# Patient Record
Sex: MALE | Race: WHITE | NOT HISPANIC OR LATINO | Employment: FULL TIME | URBAN - METROPOLITAN AREA
[De-identification: names, ages, dates, MRNs, and addresses within clinical notes are randomized per-mention and may not be internally consistent; named-entity substitution may affect disease eponyms.]

---

## 2017-01-12 ENCOUNTER — OFFICE VISIT (OUTPATIENT)
Dept: OPHTHALMOLOGY | Facility: CLINIC | Age: 62
End: 2017-01-12

## 2017-01-12 DIAGNOSIS — H25.049 POSTERIOR SUBCAPSULAR POLAR SENILE CATARACT, UNSPECIFIED LATERALITY: ICD-10-CM

## 2017-01-12 DIAGNOSIS — H52.13 MYOPIA, BILATERAL: Primary | ICD-10-CM

## 2017-01-12 ASSESSMENT — REFRACTION_WEARINGRX
SPECS_TYPE: PAL
OD_CYLINDER: +2.25
OS_AXIS: 172
OS_SPHERE: -9.25
OS_CYLINDER: +0.75
OD_AXIS: 077
OS_ADD: +2.25
OD_SPHERE: -10.50
OS_ADD: +2.25
SPECS_TYPE: PAL
OD_ADD: +2.25
OS_AXIS: 148
OS_CYLINDER: +1.00
OD_SPHERE: -11.25
OS_SPHERE: -9.00
OD_CYLINDER: +1.50
OD_ADD: +2.25
OD_AXIS: 085

## 2017-01-12 ASSESSMENT — REFRACTION
OD_AXIS: 078
OS_AXIS: 159
OS_ADD: +2.50
OD_ADD: +2.50
OS_SPHERE: -9.50
OD_CYLINDER: +2.75
OS_CYLINDER: +0.75
OD_SPHERE: -11.25

## 2017-01-12 ASSESSMENT — REFRACTION_MANIFEST
OD_AXIS: 075
OD_SPHERE: -12.00
OS_SPHERE: -9.75
OS_AXIS: 150
OS_CYLINDER: +1.00
OD_CYLINDER: +3.00

## 2017-01-12 ASSESSMENT — VISUAL ACUITY
CORRECTION_TYPE: GLASSES
OS_CC: 20/25-
METHOD: SNELLEN - LINEAR
OD_CC: 20/30+2

## 2017-01-12 ASSESSMENT — SLIT LAMP EXAM - LIDS
COMMENTS: NORMAL
COMMENTS: NORMAL

## 2017-01-12 ASSESSMENT — TONOMETRY
OS_IOP_MMHG: 12
IOP_METHOD: ICARE
OD_IOP_MMHG: 11

## 2017-01-12 ASSESSMENT — CUP TO DISC RATIO
OS_RATIO: 0.65
OD_RATIO: 0.65

## 2017-01-12 ASSESSMENT — EXTERNAL EXAM - RIGHT EYE: OD_EXAM: NORMAL

## 2017-01-12 ASSESSMENT — CONF VISUAL FIELD
METHOD: COUNTING FINGERS
OD_NORMAL: 1
OS_NORMAL: 1

## 2017-01-12 ASSESSMENT — EXTERNAL EXAM - LEFT EYE: OS_EXAM: NORMAL

## 2017-01-12 NOTE — NURSING NOTE
Chief Complaints and History of Present Illnesses   Patient presents with     COMPREHENSIVE EYE EXAM     HPI    Last Eye Exam:  1/4/16   Affected eye(s):  Both   Symptoms:     Blurred vision (Comment: occasionally seems a little less clear when his eyes are tired and he's been doing a lot of reading)   Dryness      Duration:  1 year   Frequency:  Intermittent       Do you have eye pain now?:  No      Comments:  OK Selby 8:15 AM 01/12/2017

## 2017-01-12 NOTE — PROGRESS NOTES
Assessment & Plan      Kyle Lou is a 61 year old male with the following diagnoses:   (H52.13) Myopia, bilateral  (primary encounter diagnosis)  Comment: Small change  Plan: Copy Rx    (H25.049) Posterior subcapsular polar senile cataract, unspecified laterality  Comment: Minimal  Plan: Follow     -----------------------------------------------------------------------------------      Patient disposition:   Return in about 1 year (around 1/12/2018) for Complete Eye Exam. or sooner as needed.    I have confirmed the content of the chief complaint, history of present illness, review of systems, and past medical/surgical history sections and edited the information as needed.    Complete documentation of historical and exam elements from today's encounter can  be found in the full encounter summary report (not reduplicated in this progress  note). I personally obtained the chief complaint(s) and history of present illness. I  confirmed and edited as necessary the review of systems, past medical/surgical  history, family history, social history, and examination findings as documented by  others; and I examined the patient myself. I personally reviewed the relevant tests,  images, and reports as documented above. I formulated and edited as necessary the  assessment and plan and discussed the findings and management plan with the  patient and family.    LINETTE Mcclellan M.D.

## 2017-01-12 NOTE — MR AVS SNAPSHOT
After Visit Summary   2017    Kyle Luo    MRN: [id number]           Patient Information     Date Of Birth          1955        Visit Information        Provider Department      2017 8:20 AM Modesto Mcclellan MD [facility]        Today's Diagnoses     Myopia, bilateral    -  1     Posterior subcapsular polar senile cataract, unspecified laterality            Follow-ups after your visit        Follow-up notes from your care team     Return in about 1 year (around 2018) for Complete Eye Exam.      Who to contact     Please call your clinic at [phone] to:    Ask questions about your health    Make or cancel appointments    Discuss your medicines    Learn about your test results    Speak to your doctor   If you have compliments or concerns about an experience at your clinic, or if you wish to file a complaint, please contact [facility] Patient Relations at [phone] or email us at [email or web address]         Additional Information About Your Visit        MyChart Information     JiaThis is an electronic gateway that provides easy, online access to your medical records. With JiaThis, you can request a clinic appointment, read your test results, renew a prescription or communicate with your care team.     To sign up for JiaThis visit the website at [email or web address]   You will be asked to enter the access code listed below, as well as some personal information. Please follow the directions to create your username and password.     Your access code is: [email or web address]  Expires: 2017 10:12 AM     Your access code will  in 90 days. If you need help or a new code, please contact your [facility] or call [phone] for assistance.        Care EveryWhere ID     This is your Care EveryWhere ID. This could be used by other organizations to access your [place]  medical records  [id number]         Blood Pressure from Last 3 Encounters:   No data found for BP    Weight from Last 3 Encounters:   No data found for Wt              Today, you had the following     No orders found for display       Primary Care Provider Office Phone # Fax #    Jonnathan Ramírez [phone]       [place] [id number] JANKI ALFRED [address] [address]        Thank you!     Thank you for choosing [facility]  for your care. Our goal is always to provide you with excellent care. Hearing back from our patients is one way we can continue to improve our services. Please take a few minutes to complete the written survey that you may receive in the mail after your visit with us. Thank you!             Your Updated Medication List - Protect others around you: Learn how to safely use, store and throw away your medicines at www.disposemymeds.org.          This list is accurate as of: 1/12/17 10:12 AM.  Always use your most recent med list.                   Brand Name Dispense Instructions for use    ASPIRIN PO      Take 81 mg by mouth daily       LOSARTAN POTASSIUM PO      Take 0.5 mg by mouth daily       SYNTHROID PO      Take 0.125 mg by mouth daily

## 2017-04-30 ENCOUNTER — HOSPITAL ENCOUNTER (INPATIENT)
Facility: CLINIC | Age: 62
LOS: 1 days | Discharge: HOME | DRG: 313 | End: 2017-05-01
Attending: EMERGENCY MEDICINE | Admitting: INTERNAL MEDICINE
Payer: COMMERCIAL

## 2017-04-30 ENCOUNTER — APPOINTMENT (OUTPATIENT)
Dept: CT IMAGING | Facility: CLINIC | Age: 62
DRG: 313 | End: 2017-04-30
Attending: EMERGENCY MEDICINE
Payer: COMMERCIAL

## 2017-04-30 DIAGNOSIS — I21.4 NSTEMI (NON-ST ELEVATED MYOCARDIAL INFARCTION) (H): ICD-10-CM

## 2017-04-30 PROBLEM — I24.9 ACS (ACUTE CORONARY SYNDROME) (H): Status: ACTIVE | Noted: 2017-04-30

## 2017-04-30 LAB
ALBUMIN SERPL-MCNC: 3.8 G/DL (ref 3.4–5)
ALP SERPL-CCNC: 70 U/L (ref 40–150)
ALT SERPL W P-5'-P-CCNC: 16 U/L (ref 0–70)
ANION GAP SERPL CALCULATED.3IONS-SCNC: 10 MMOL/L (ref 3–14)
AST SERPL W P-5'-P-CCNC: 15 U/L (ref 0–45)
BASOPHILS # BLD AUTO: 0 10E9/L (ref 0–0.2)
BASOPHILS NFR BLD AUTO: 0.5 %
BILIRUB SERPL-MCNC: 0.8 MG/DL (ref 0.2–1.3)
BUN SERPL-MCNC: 22 MG/DL (ref 7–30)
CALCIUM SERPL-MCNC: 8.6 MG/DL (ref 8.5–10.1)
CHLORIDE SERPL-SCNC: 104 MMOL/L (ref 94–109)
CO2 SERPL-SCNC: 26 MMOL/L (ref 20–32)
CREAT SERPL-MCNC: 0.93 MG/DL (ref 0.66–1.25)
DIFFERENTIAL METHOD BLD: ABNORMAL
EOSINOPHIL # BLD AUTO: 0.2 10E9/L (ref 0–0.7)
EOSINOPHIL NFR BLD AUTO: 2.5 %
ERYTHROCYTE [DISTWIDTH] IN BLOOD BY AUTOMATED COUNT: 12.3 % (ref 10–15)
ERYTHROCYTE [DISTWIDTH] IN BLOOD BY AUTOMATED COUNT: 12.4 % (ref 10–15)
GFR SERPL CREATININE-BSD FRML MDRD: 82 ML/MIN/1.7M2
GLUCOSE SERPL-MCNC: 144 MG/DL (ref 70–99)
HCT VFR BLD AUTO: 40.9 % (ref 40–53)
HCT VFR BLD AUTO: 42.5 % (ref 40–53)
HGB BLD-MCNC: 13.7 G/DL (ref 13.3–17.7)
HGB BLD-MCNC: 14.4 G/DL (ref 13.3–17.7)
IMM GRANULOCYTES # BLD: 0 10E9/L (ref 0–0.4)
IMM GRANULOCYTES NFR BLD: 0.2 %
LIPASE SERPL-CCNC: 124 U/L (ref 73–393)
LMWH PPP CHRO-ACNC: 0.34 IU/ML
LYMPHOCYTES # BLD AUTO: 1.8 10E9/L (ref 0.8–5.3)
LYMPHOCYTES NFR BLD AUTO: 30 %
MCH RBC QN AUTO: 31.1 PG (ref 26.5–33)
MCH RBC QN AUTO: 31.4 PG (ref 26.5–33)
MCHC RBC AUTO-ENTMCNC: 33.5 G/DL (ref 31.5–36.5)
MCHC RBC AUTO-ENTMCNC: 33.9 G/DL (ref 31.5–36.5)
MCV RBC AUTO: 93 FL (ref 78–100)
MCV RBC AUTO: 93 FL (ref 78–100)
MONOCYTES # BLD AUTO: 0.4 10E9/L (ref 0–1.3)
MONOCYTES NFR BLD AUTO: 7.1 %
NEUTROPHILS # BLD AUTO: 3.5 10E9/L (ref 1.6–8.3)
NEUTROPHILS NFR BLD AUTO: 59.7 %
NRBC # BLD AUTO: 0 10*3/UL
NRBC BLD AUTO-RTO: 0 /100
PLATELET # BLD AUTO: 137 10E9/L (ref 150–450)
PLATELET # BLD AUTO: 138 10E9/L (ref 150–450)
POTASSIUM SERPL-SCNC: 4.2 MMOL/L (ref 3.4–5.3)
PROT SERPL-MCNC: 7 G/DL (ref 6.8–8.8)
RBC # BLD AUTO: 4.41 10E12/L (ref 4.4–5.9)
RBC # BLD AUTO: 4.58 10E12/L (ref 4.4–5.9)
SODIUM SERPL-SCNC: 140 MMOL/L (ref 133–144)
T4 FREE SERPL-MCNC: 1.59 NG/DL (ref 0.76–1.46)
TROPONIN I SERPL-MCNC: 0.05 UG/L (ref 0–0.04)
TSH SERPL DL<=0.005 MIU/L-ACNC: 0.07 MU/L (ref 0.4–4)
WBC # BLD AUTO: 5.9 10E9/L (ref 4–11)
WBC # BLD AUTO: 7.6 10E9/L (ref 4–11)

## 2017-04-30 PROCEDURE — 84484 ASSAY OF TROPONIN QUANT: CPT | Performed by: INTERNAL MEDICINE

## 2017-04-30 PROCEDURE — 99223 1ST HOSP IP/OBS HIGH 75: CPT | Mod: AI | Performed by: INTERNAL MEDICINE

## 2017-04-30 PROCEDURE — 84484 ASSAY OF TROPONIN QUANT: CPT | Performed by: EMERGENCY MEDICINE

## 2017-04-30 PROCEDURE — 85027 COMPLETE CBC AUTOMATED: CPT | Performed by: INTERNAL MEDICINE

## 2017-04-30 PROCEDURE — 71260 CT THORAX DX C+: CPT

## 2017-04-30 PROCEDURE — 99222 1ST HOSP IP/OBS MODERATE 55: CPT | Performed by: INTERNAL MEDICINE

## 2017-04-30 PROCEDURE — 25500064 ZZH RX 255 OP 636: Performed by: EMERGENCY MEDICINE

## 2017-04-30 PROCEDURE — 25000128 H RX IP 250 OP 636: Performed by: EMERGENCY MEDICINE

## 2017-04-30 PROCEDURE — 99285 EMERGENCY DEPT VISIT HI MDM: CPT | Mod: 25

## 2017-04-30 PROCEDURE — 84443 ASSAY THYROID STIM HORMONE: CPT | Performed by: INTERNAL MEDICINE

## 2017-04-30 PROCEDURE — 80053 COMPREHEN METABOLIC PANEL: CPT | Performed by: EMERGENCY MEDICINE

## 2017-04-30 PROCEDURE — 93005 ELECTROCARDIOGRAM TRACING: CPT

## 2017-04-30 PROCEDURE — 36415 COLL VENOUS BLD VENIPUNCTURE: CPT | Performed by: INTERNAL MEDICINE

## 2017-04-30 PROCEDURE — 25000132 ZZH RX MED GY IP 250 OP 250 PS 637: Performed by: EMERGENCY MEDICINE

## 2017-04-30 PROCEDURE — 21000001 ZZH R&B HEART CARE

## 2017-04-30 PROCEDURE — 85520 HEPARIN ASSAY: CPT | Performed by: INTERNAL MEDICINE

## 2017-04-30 PROCEDURE — 85025 COMPLETE CBC W/AUTO DIFF WBC: CPT | Performed by: EMERGENCY MEDICINE

## 2017-04-30 PROCEDURE — 96374 THER/PROPH/DIAG INJ IV PUSH: CPT | Mod: 59

## 2017-04-30 PROCEDURE — 84439 ASSAY OF FREE THYROXINE: CPT | Performed by: INTERNAL MEDICINE

## 2017-04-30 PROCEDURE — 25000125 ZZHC RX 250: Performed by: EMERGENCY MEDICINE

## 2017-04-30 PROCEDURE — 83690 ASSAY OF LIPASE: CPT | Performed by: EMERGENCY MEDICINE

## 2017-04-30 RX ORDER — NALOXONE HYDROCHLORIDE 0.4 MG/ML
.1-.4 INJECTION, SOLUTION INTRAMUSCULAR; INTRAVENOUS; SUBCUTANEOUS
Status: DISCONTINUED | OUTPATIENT
Start: 2017-04-30 | End: 2017-05-01 | Stop reason: HOSPADM

## 2017-04-30 RX ORDER — IOPAMIDOL 755 MG/ML
66 INJECTION, SOLUTION INTRAVASCULAR ONCE
Status: COMPLETED | OUTPATIENT
Start: 2017-04-30 | End: 2017-04-30

## 2017-04-30 RX ORDER — LEVOTHYROXINE SODIUM 125 UG/1
125 TABLET ORAL DAILY
Status: DISCONTINUED | OUTPATIENT
Start: 2017-05-01 | End: 2017-05-01 | Stop reason: HOSPADM

## 2017-04-30 RX ORDER — ACETAMINOPHEN 650 MG/1
650 SUPPOSITORY RECTAL EVERY 4 HOURS PRN
Status: DISCONTINUED | OUTPATIENT
Start: 2017-04-30 | End: 2017-05-01 | Stop reason: HOSPADM

## 2017-04-30 RX ORDER — BISACODYL 10 MG
10 SUPPOSITORY, RECTAL RECTAL DAILY PRN
Status: DISCONTINUED | OUTPATIENT
Start: 2017-04-30 | End: 2017-05-01 | Stop reason: HOSPADM

## 2017-04-30 RX ORDER — ASPIRIN 81 MG/1
324 TABLET, CHEWABLE ORAL ONCE
Status: DISCONTINUED | OUTPATIENT
Start: 2017-04-30 | End: 2017-04-30

## 2017-04-30 RX ORDER — HYDROMORPHONE HYDROCHLORIDE 1 MG/ML
0.2 INJECTION, SOLUTION INTRAMUSCULAR; INTRAVENOUS; SUBCUTANEOUS
Status: DISCONTINUED | OUTPATIENT
Start: 2017-04-30 | End: 2017-05-01 | Stop reason: HOSPADM

## 2017-04-30 RX ORDER — ASPIRIN 81 MG/1
81 TABLET, CHEWABLE ORAL DAILY
Status: DISCONTINUED | OUTPATIENT
Start: 2017-04-30 | End: 2017-04-30

## 2017-04-30 RX ORDER — UBIDECARENONE 75 MG
CAPSULE ORAL DAILY
COMMUNITY
End: 2018-01-31

## 2017-04-30 RX ORDER — ONDANSETRON 2 MG/ML
4 INJECTION INTRAMUSCULAR; INTRAVENOUS EVERY 6 HOURS PRN
Status: DISCONTINUED | OUTPATIENT
Start: 2017-04-30 | End: 2017-05-01 | Stop reason: HOSPADM

## 2017-04-30 RX ORDER — ONDANSETRON 4 MG/1
4 TABLET, ORALLY DISINTEGRATING ORAL EVERY 6 HOURS PRN
Status: DISCONTINUED | OUTPATIENT
Start: 2017-04-30 | End: 2017-05-01 | Stop reason: HOSPADM

## 2017-04-30 RX ORDER — ACETAMINOPHEN 325 MG/1
650 TABLET ORAL EVERY 4 HOURS PRN
Status: DISCONTINUED | OUTPATIENT
Start: 2017-04-30 | End: 2017-05-01 | Stop reason: HOSPADM

## 2017-04-30 RX ORDER — AMOXICILLIN 250 MG
1-2 CAPSULE ORAL 2 TIMES DAILY PRN
Status: DISCONTINUED | OUTPATIENT
Start: 2017-04-30 | End: 2017-05-01 | Stop reason: HOSPADM

## 2017-04-30 RX ORDER — ASPIRIN 81 MG/1
324 TABLET, CHEWABLE ORAL ONCE
Status: COMPLETED | OUTPATIENT
Start: 2017-04-30 | End: 2017-04-30

## 2017-04-30 RX ORDER — LOSARTAN POTASSIUM 25 MG/1
25 TABLET ORAL DAILY
Status: DISCONTINUED | OUTPATIENT
Start: 2017-05-01 | End: 2017-05-01 | Stop reason: HOSPADM

## 2017-04-30 RX ORDER — ASPIRIN 325 MG
325 TABLET ORAL DAILY
Status: DISCONTINUED | OUTPATIENT
Start: 2017-05-01 | End: 2017-05-01 | Stop reason: HOSPADM

## 2017-04-30 RX ORDER — LIDOCAINE 40 MG/G
CREAM TOPICAL
Status: DISCONTINUED | OUTPATIENT
Start: 2017-04-30 | End: 2017-05-01 | Stop reason: HOSPADM

## 2017-04-30 RX ORDER — ALUMINA, MAGNESIA, AND SIMETHICONE 2400; 2400; 240 MG/30ML; MG/30ML; MG/30ML
15-30 SUSPENSION ORAL EVERY 4 HOURS PRN
Status: DISCONTINUED | OUTPATIENT
Start: 2017-04-30 | End: 2017-05-01 | Stop reason: HOSPADM

## 2017-04-30 RX ADMIN — ASPIRIN 81 MG 243 MG: 81 TABLET ORAL at 13:03

## 2017-04-30 RX ADMIN — HEPARIN SODIUM 950 UNITS/HR: 10000 INJECTION, SOLUTION INTRAVENOUS at 14:32

## 2017-04-30 RX ADMIN — SODIUM CHLORIDE 91 ML: 9 INJECTION, SOLUTION INTRAVENOUS at 13:44

## 2017-04-30 RX ADMIN — IOPAMIDOL 66 ML: 755 INJECTION, SOLUTION INTRAVENOUS at 13:44

## 2017-04-30 RX ADMIN — Medication 4750 UNITS: at 14:29

## 2017-04-30 ASSESSMENT — ENCOUNTER SYMPTOMS
WEAKNESS: 0
BACK PAIN: 1
NUMBNESS: 0
DIAPHORESIS: 1
ABDOMINAL PAIN: 0
CHEST TIGHTNESS: 1

## 2017-04-30 ASSESSMENT — ACTIVITIES OF DAILY LIVING (ADL)
BATHING: 0-->INDEPENDENT
SWALLOWING: 0-->SWALLOWS FOODS/LIQUIDS WITHOUT DIFFICULTY
TOILETING: 0-->INDEPENDENT
AMBULATION: 0-->INDEPENDENT
COGNITION: 0 - NO COGNITION ISSUES REPORTED
TRANSFERRING: 0-->INDEPENDENT
RETIRED_EATING: 0-->INDEPENDENT
DRESS: 0-->INDEPENDENT
FALL_HISTORY_WITHIN_LAST_SIX_MONTHS: NO
RETIRED_COMMUNICATION: 0-->UNDERSTANDS/COMMUNICATES WITHOUT DIFFICULTY

## 2017-04-30 ASSESSMENT — PAIN DESCRIPTION - DESCRIPTORS: DESCRIPTORS: ACHING

## 2017-04-30 NOTE — PLAN OF CARE
Problem: Goal Outcome Summary  Goal: Goal Outcome Summary  Outcome: No Change  Pt admitted from ED this afternoon for CP and near syncope. VSS. Tele shows SB with 1st degree AVB. Pt denies any chest pain or SOB. Heparin continues at 950u/hr. Plan is for cardiology consult. Will continue to monitor pt.

## 2017-04-30 NOTE — H&P
PRIMARY CARE PHYSICIAN:  Jonnathan Ramírez MD.       CODE STATUS:  Full code.      CHIEF COMPLAINT:  Chest and back pain and near-syncope.      HISTORY OF PRESENT ILLNESS:  Mr. Kyle Lou is a 61-year-old male who has a past medical history of hypertension, hypothyroidism and myopia.  This patient also has a bicuspid aortic valve and has seen a cardiologist in the past for this.  He says that this is monitored with imaging every year and his last study was done last autumn and that looked okay.  He believes this was by either an echo or MRI.  The patient was in [employer] today when he started having severe pain in his back that radiated into his chest.  He says that it felt like it was wrapping around his whole upper body.  He also began to have diaphoresis and said it felt like he was going to pass out.  They brought him into the Emergency Department for further evaluation.  In the ED, his EKG looks unchanged.  There are some signs of first degree AV block, but no concerning signs of acute ischemia.  However, his troponin levels returned elevated at 0.048.  The patient reports his symptoms resolved prior to arrival to the Emergency Department and he has had no subsequent symptoms since.  He denies any shortness of breath, nausea or vomiting.  He tells me that he is a pretty athletic person.  He works out pretty rigorously.  He said he did a pretty tough cardiovascular workout yesterday and had no pain.  Given his elevated troponin level, there is concern for non-ST elevation MI and patient will be brought in for further evaluation.      ALLERGIES:  No known drug allergies.      HOME MEDICATIONS:   1.  Vitamin B12 100 mcg daily.   2.  Synthroid 125 mcg daily.   3.  Losartan 0.5 mg daily.   4.  Aspirin 81 mg a day.      PAST MEDICAL HISTORY:   1.  Hypertension.   2.  Hypothyroidism.   3.  Myopia   4.  Bicuspid aortic valve.      PAST SURGICAL HISTORY:   1.  Varicose vein removal, left lower extremity.   2.  Right retinal  detachment repair.   3.  Testicular varicosity removed.   4.  Bicuspid valve repair.   5.  Radical resection of the tonsils.      FAMILY HISTORY:  The patient is adopted and he thinks his mother had CHF but he is not sure.  He is really unaware of any other family history.      SOCIAL HISTORY:  Has a remote history of smoking cigarettes.  He smoked about 1/4 pack a day for 4 years when he was much younger.  He quit in 1984.  He does not drink alcohol.  He has no illicit drug history.  He works at a law firm.  He is .  He and his wife live independently.      REVIEW OF SYSTEMS:  A 10-system review conducted with the patient.  Other than those systems listed in history present illness are negative.      PHYSICAL EXAMINATION:   VITAL SIGNS:  Blood pressure 123/55, O2 sats are 99% on room air, respiratory rate 16, heart rate 52, temperature 98.2 degrees Fahrenheit taken orally.   GENERAL:  This is a well-nourished appearing, middle-aged male who appears appropriate for stated age of 61.  He is in no apparent distress, alert and oriented x4.   HEENT:  Normocephalic, atraumatic.  No oropharyngeal erythema.   NECK:  No cervical lymphadenopathy, no thyromegaly.   RESPIRATORY:  Lungs clear to auscultation bilaterally.  Normal work of breathing.  No wheezing, rales or rhonchi.   CARDIOVASCULAR:  Normal S1, S2, no S3, S4, regular rate and rhythm, no murmurs, rubs or gallops, 2+ pulses palpable in all 4 extremities.   ABDOMEN:  Normal bowel sounds.  Soft, nontender, nondistended.  No hepatosplenomegaly or mass palpable.   EXTREMITIES:  No clubbing, cyanosis or edema.   NEUROLOGIC:  Cranial nerves II-XII intact, 5/5 strength in all 4 extremities, sensation intact diffusely, normal coordination by bilateral finger-nose test.      LABORATORY DATA:  On complete metabolic profile, all values within normal limits.  Troponin level was 0.048, nonfasting glucose is 144.  On CBC, platelets are low 138.  All other values are  normal.      IMAGING STUDIES:  CT chest pulmonary embolism with contrast.  FINDINGS:  Lung parenchyma clear.  No pleural effusions, no pneumothorax, pulmonary artery showed no filling defects in bilateral pulmonary arteries to suggest significant pulmonary embolus.  Heart is unremarkable.  Aorta is normal, no evidence of aortic dissection.  There is no axillary, mediastinal or hilar lymphadenopathy appreciated.      EKG shows sinus bradycardia, first-degree AV block, left atrial enlargement.  No significant changes.      ASSESSMENT:  This is a 61-year-old male with past medical history of hypertension, hypothyroidism, myopia, bicuspid aortic valve.  He had several minutes of back pain radiating into his chest along with near-syncope and resolved by the time he was seen in the emergency department being admitted due to positive troponin level, given this and his symptoms are concerning for acute coronary syndrome/non-ST-segment myocardial infarction and he will be admitted for further workup and cardiology consult.   1.  Non-ST elevation myocardial infarction.  Given his age above 50, male gender and his hypertension, he has enough risk factors to put him at a higher risk category.  His troponin is a little elevated at 0.048.  He was put on a heparin drip in the Emergency Department.  I will continue this for now, we will admit him to [facility], place him on cardiac telemetry.  The only testing I am going to order at this time is an echocardiogram just to make sure there is no relation to what is going on here in the state of his bicuspid aortic valve.  I will also ask Cardiology to consult.  I will defer any further invasive testing for coronary artery disease to them.  At this point, he is not really a candidate for stress testing due to his positive troponin, so will ask Cardiology to see if they would recommend a coronary angiogram or some other mode of testing such as CT angiogram.  The patient has Dilaudid available  for pain, I will keep him n.p.o. for now, we will watch serial troponins at q.6h. intervals.    2.  History of hypertension.  Blood pressure is normotensive at present.  At home, he takes Losartan 0.5 mg a day, which I will continue for him here.   3.  Hypothyroidism.  The patient takes 125 mcg of Synthroid, which I will continue for him here.  I doubt his thyroid has anything to do with his presentation.  I will check a TSH just to make sure.   4.  Deep venous thrombosis prophylaxis. The patient is anticoagulated on heparin drip.   5.  Code status:  The patient is full code.   6.  Disposition.  Ultimately depends on what direction his troponins are and the results of any tests.       DISPOSITION:  I anticipate 1-2 night stay.         MILO FOOTE MD             D: 2017 14:36   T: 2017 15:49   MT: CIELO      Name:     JOMAR STEWART   MRN:      [id number]        Account:      [id number]   :      1955           Admitted:     [id number]      Document: [id number]       cc: Jonnathan Ramírez MD

## 2017-04-30 NOTE — ED NOTES
"[facility]  ED Nurse Handoff Report    ED Chief complaint: Loss of Consciousness (sitting at [employer] and had sudden onset of back pain radiating to chest along with diaphoresis. denies s/s now.)      ED Diagnosis:   Final diagnoses:   None       Code Status: Full Code    Allergies: No Known Allergies    Activity level - Baseline/Home:  Independent    Activity Level - Current:   Independent     Needed?: No    Isolation: No  Infection: Not Applicable    Bariatric?: No    Vital Signs:   Vitals:    04/30/17 1240   BP: 123/55   Pulse: 52   Resp: 16   Temp: 98.3  F (36.8  C)   TempSrc: Oral   SpO2: 99%   Weight: 79.4 kg (175 lb)   Height: 1.905 m (6' 3\")       Cardiac Rhythm: ,        Pain level: 0-10 Pain Scale: 0    Is this patient confused?: No    Patient Report: Initial Complaint: pt was sitting at [employer] and developed mid back pain that then radiated up to his chest/epigastric area. This lasted less then 2 mins. On arrival pt denies any symptoms. Pt states he had a similar episode earlier this week lasting about 1 minute.   Focused Assessment: AOx4. Denies any CP or other symptoms. VSS, BP high. Denies SOB. SB c 1st AVB.  Tests Performed: labs, CT chest  Abnormal Results: trop ^  Treatments provided: baby aspirin, will start heparin drip     Family Comments: wife at bedside    OBS brochure/video discussed/provided to patient: No    ED Medications:   Medications   sodium chloride (PF) 0.9% PF flush 3 mL (not administered)   sodium chloride (PF) 0.9% PF flush 3 mL (not administered)   iopamidol (ISOVUE-370) solution 66 mL (not administered)   sodium chloride 0.9 % for CT scan flush dose 91 mL (not administered)   aspirin chewable tablet 324 mg (243 mg Oral Given 4/30/17 1303)       Drips infusing?:  Heparin     ED NURSE PHONE NUMBER: [phone]         "

## 2017-04-30 NOTE — IP AVS SNAPSHOT
[facility]    [address] [place] [address]., [address]    [place] [address]    Phone:  [phone]                                       After Visit Summary   4/30/2017    Kyle Lou    MRN: [id number]           After Visit Summary Signature Page     I have received my discharge instructions, and my questions have been answered. I have discussed any challenges I see with this plan with the nurse or doctor.    ..........................................................................................................................................  Patient/Patient Representative Signature      ..........................................................................................................................................  Patient Representative Print Name and Relationship to Patient    ..................................................               ................................................  Date                                            Time    ..........................................................................................................................................  Reviewed by Signature/Title    ...................................................              ..............................................  Date                                                            Time

## 2017-04-30 NOTE — ED PROVIDER NOTES
"  History     Chief Complaint:  Chest pain and back pain, near syncope    HPI   Kyle Lou is a 61 year old male with a history of myopia on aspirin and losartan who presents with spouse to the emergency department today for evaluation of chest pain and back pain, near syncope. Mr. Lou was sitting at [employer] when he began to develop sudden onset of back pain radiating to chest followed by diaphoresis near syncope. He states the pain feels like chest tightness wrapping to back.\"  Spouse recommended patient come in for evaluation prompting visit. Here, patient is asymptomatic. No abdominal pain.   No numbness, weakness, and tingling of extremities.    CARDIAC RISK FACTORS  SEX:                  Male  Tobacco:             Former smoker  Hypertension:        Positive  Diabetes:             Negative  Lipids:                Negative  Personal History:  Negative  Family History:       Unknown    PE/DVT RISK FACTORS  Hx of PE/DVT:   Negative  Hx of clotting disorder:  Negative  Hx of cancer:    Negative  IV drug use:   Negative  Hormone therapy:   Negative  Prolonged immobilization:  Negative  Recent surgery:   Negative  Recent travel:    Negative  Familial Hx of PE/DVT:  Negative       Allergies:  NKDA     Medications:    Losartan  Synthroid  Aspirin    Past Medical History:    Myopia    Past Surgical History:    Resection tonsil/pillars  Retinal reattachment    Family History:    Mother: Lung problems    Social History:  Marital Status:   [2]  Tobacco: Former Smoker  Alcohol: Negative  Presents with spouse.   PCP: Jonnathan Ramírez    Review of Systems   Constitutional: Positive for diaphoresis.   Respiratory: Positive for chest tightness.    Gastrointestinal: Negative for abdominal pain.   Musculoskeletal: Positive for back pain.   Neurological: Negative for weakness and numbness.   All other systems reviewed and are negative.    Physical Exam   First Vitals:  Patient Vitals for the past 24 hrs:   BP Temp Temp " "src Pulse Resp SpO2 Height Weight   17 1240 123/55 98.3  F (36.8  C) Oral 52 16 99 % 1.905 m (6' 3\") 79.4 kg (175 lb)      Physical Exam  General: Alert, interactive in mild distress  Head:  Scalp is atraumatic  Eyes:  The pupils are equal, round, and reactive to light    EOM's intact    No scleral icterus  ENT:      Nose:  The external nose is normal  Ears:  External ears are normal  Mouth/Throat: The oropharynx is normal    Mucus membranes are dry      Neck:  Normal range of motion.      There is no rigidity.    Trachea is in the midline         CV:  Regular rate and rhythm    No murmur    Resp:  Breath sounds are clear bilaterally    Non-labored, no retractions or accessory muscle use      GI:  Abdomen is soft, no distension, no tenderness.       MS:  Normal strength in all 4 extremities, 2+ pulses in all 4 extremities.   Skin:  Warm and dry, No rash or lesions noted.  Neuro: Strength 5/5 x4.  Sensation intact  In all 4 extremities.        Psych:  Awake. Alert.  Normal affect.      Appropriate interactions.  Emergency Department Course   EC2017 12:33:12  Indication: rule out cardiac etiology  Findings:    Sinus bradycardia with 1st degree AV block   .Possible Left atrial enlargement   Borderline ECG.  Ventricular rate: 49 bpm  ME interval: 218 ms  QRS duration: 88 ms  QT/QTc: 502/453 ms  R-Axis: 71  No significant change from ECG dated 2000.   ECG read at 12:43 by Dr. Kaur.    Imaging:  Radiographic findings were communicated with the patient and family who voiced understanding of the findings.    Chest CT  PE protocol with contrast:  No evidence of acute pulmonary embolus.   Final reading per radiology.     Laboratory:  CBC:  WBC 5.9, HGB 14.4,   Troponin: 0.048 (H)  CMP: Glucose 144 (H) o/w WNL. (Creatinine 0.93)  Lipase: 124    Interventions:  13:03 Aspirin 243 mg Oral    Emergency Department Course:  12:33 EKG obtained in the ED, see results above.     Nursing notes and vitals " reviewed.    12:44 I performed an exam of the patient as documented above.     12:58 Blood drawn.  This was sent to the lab for further testing, results above.    The patient was taken for CT scan, see imaging results above.     The patient received the intervention(s) above.    13:56 Recheck patient. Findings and plan explained to the Patient and spouse who consents to admission.     14:07 Discussed the patient with Dr. Mejia, who will admit the patient to a telemetry bed for further monitoring, evaluation, and treatment.    Impression & Plan    Medical Decision Making:  Kyle Lou is a 61 year old male was seen and evaluated. The above work-up was undertaken. His chest pain has resolved at time of my evaluation and his initial EKG Is unremarkable. However, he does have an elevated troponin as well as cardiac risk factors. I am concerned for possible NSTEMI. The given the significant radiated pain to back, CT imaging of the chest was performed to evaluated for PE and aortic dissection. Thankfully, returning is unremarkable. Given the elevated troponin and compelling story, the patient received aspirin and anticoagulation with heparin. I spoke with Dr. Mejia who is agreement with admission to the [facility] for further evaluation and treatment.     Diagnosis:    ICD-10-CM    1. NSTEMI (non-ST elevated myocardial infarction) (H) I21.4        Disposition:  Admitted to [facility] bed under the care of Dr. Mejia for further evaluation and treatment.     Scribe Disclosure:  I, Elsie Brown, am serving as a scribe at 12:44 PM on 4/30/2017 to document services personally performed by Kd Kaur MD, based on my observations and the provider's statements to me.  Elsie Brown  4/30/2017    EMERGENCY DEPARTMENT       Kd Kaur MD  04/30/17 9983

## 2017-04-30 NOTE — IP AVS SNAPSHOT
MRN:[id number]                      After Visit Summary   4/30/2017    Kyle Lou    MRN: [id number]           Thank you!     Thank you for choosing [facility] for your care. Our goal is always to provide you with excellent care. Hearing back from our patients is one way we can continue to improve our services. Please take a few minutes to complete the written survey that you may receive in the mail after you visit with us. Thank you!        Patient Information     Date Of Birth          1955        Designated Caregiver       Most Recent Value    Caregiver    Will someone help with your care after discharge? no      About your hospital stay     You were admitted on:  April 30, 2017 You last received care in the:  [facility]    You were discharged on:  May 1, 2017        Reason for your hospital stay       You have been hospitalized with chest pain and a mild elevation of your troponin (a heart muscle enzyme).                  Who to Call     For medical emergencies, please call 911.  For non-urgent questions about your medical care, please call your primary care provider or clinic, [phone]          Attending Provider     Provider Specialty    Trigger, Kd Caldwell MD Emergency Medicine    LacarneJon MD Internal Medicine       Primary Care Provider Office Phone # Fax #    Jonnathan Ramírez [phone]       [place] [address]        After Care Instructions     Activity       Your activity upon discharge: activity as tolerated            Diet       Follow this diet upon discharge: Orders Placed This Encounter      Combination Diet Low Saturated Fat Na <2400mg Diet, No Caffeine Diet                  Follow-up Appointments     Follow-up and recommended labs and tests        Follow up with Dr. Ray, [facility] in 1 week.                  Pending Results     No orders found for  "last 3 day(s).            Statement of Approval     Ordered          17 1617  I have reviewed and agree with all the recommendations and orders detailed in this document.  EFFECTIVE NOW     Approved and electronically signed by:  Alfredo Narvaez MD             Admission Information     Date & Time Provider Department Dept. Phone    2017 Jon Mejia MD [facility] [phone]      Your Vitals Were     Blood Pressure Pulse Temperature Respirations Height Weight    121/73 (BP Location: Left arm) 60 97.8  F (36.6  C) (Oral) 20 1.905 m (6' 3\") 80.7 kg (178 lb)    Pulse Oximetry BMI (Body Mass Index)                96% 22.25 kg/m2          MyChart Information     [employer] lets you send messages to your doctor, view your test results, renew your prescriptions, schedule appointments and more. To sign up, go to [email or web address] . Click on \"Log in\" on the left side of the screen, which will take you to the Welcome page. Then click on \"Sign up Now\" on the right side of the page.     You will be asked to enter the access code listed below, as well as some personal information. Please follow the directions to create your username and password.     Your access code is: [email or web address]  Expires: 2017  4:33 PM     Your access code will  in 90 days. If you need help or a new code, please call your [facility] clinic or [phone].        Care EveryWhere ID     This is your Care EveryWhere ID. This could be used by other organizations to access your [facility] medical records  [id number]           Review of your medicines      CONTINUE these medicines which have NOT CHANGED        Dose / Directions    ASPIRIN PO        Dose:  81 mg   Take 81 mg by mouth daily   Refills:  0       cyanocolbalamin 100 MCG tablet   Commonly known as:  vitamin  B-12        Take by mouth daily   Refills:  0       LOSARTAN POTASSIUM PO        Dose:  25 mg   Take 25 mg by mouth daily   Refills: "  0       SYNTHROID PO        Dose:  125 mcg   Take 125 mcg by mouth daily   Refills:  0                Protect others around you: Learn how to safely use, store and throw away your medicines at www.disposemymeds.org.             Medication List: This is a list of all your medications and when to take them. Check marks below indicate your daily home schedule. Keep this list as a reference.      Medications           Morning Afternoon Evening Bedtime As Needed    ASPIRIN PO   Take 81 mg by mouth daily   Last time this was given:  325 mg on 5/1/2017  9:56 AM   Next Dose Due:  5/2 Tommorow                                   cyanocolbalamin 100 MCG tablet   Commonly known as:  vitamin  B-12   Take by mouth daily   Next Dose Due:  5/2 TOMORROW                                   LOSARTAN POTASSIUM PO   Take 25 mg by mouth daily   Last time this was given:  25 mg on 5/1/2017  9:56 AM   Next Dose Due:  5/2 TOMORROW                                   SYNTHROID PO   Take 125 mcg by mouth daily   Last time this was given:  125 mcg on 5/1/2017  9:56 AM   Next Dose Due:  5/2 Tomorrow

## 2017-05-01 ENCOUNTER — COMMUNICATION - HEALTHEAST (OUTPATIENT)
Dept: CARDIOLOGY | Facility: CLINIC | Age: 62
End: 2017-05-01

## 2017-05-01 ENCOUNTER — APPOINTMENT (OUTPATIENT)
Dept: CARDIOLOGY | Facility: CLINIC | Age: 62
DRG: 313 | End: 2017-05-01
Attending: INTERNAL MEDICINE
Payer: COMMERCIAL

## 2017-05-01 ENCOUNTER — RECORDS - HEALTHEAST (OUTPATIENT)
Dept: ADMINISTRATIVE | Facility: OTHER | Age: 62
End: 2017-05-01

## 2017-05-01 ENCOUNTER — AMBULATORY - HEALTHEAST (OUTPATIENT)
Dept: CARDIOLOGY | Facility: CLINIC | Age: 62
End: 2017-05-01

## 2017-05-01 VITALS
RESPIRATION RATE: 20 BRPM | TEMPERATURE: 97.8 F | OXYGEN SATURATION: 96 % | DIASTOLIC BLOOD PRESSURE: 73 MMHG | WEIGHT: 178 LBS | HEART RATE: 60 BPM | BODY MASS INDEX: 22.13 KG/M2 | HEIGHT: 75 IN | SYSTOLIC BLOOD PRESSURE: 121 MMHG

## 2017-05-01 LAB
INTERPRETATION ECG - MUSE: NORMAL
LMWH PPP CHRO-ACNC: 0.25 IU/ML
TROPONIN I SERPL-MCNC: 0.05 UG/L (ref 0–0.04)

## 2017-05-01 PROCEDURE — 25500064 ZZH RX 255 OP 636: Performed by: INTERNAL MEDICINE

## 2017-05-01 PROCEDURE — 40000264 ECHO STRESS TEST WITH LUMASON

## 2017-05-01 PROCEDURE — 99232 SBSQ HOSP IP/OBS MODERATE 35: CPT | Mod: 25 | Performed by: INTERNAL MEDICINE

## 2017-05-01 PROCEDURE — 85520 HEPARIN ASSAY: CPT | Performed by: INTERNAL MEDICINE

## 2017-05-01 PROCEDURE — 40000264 ECHO COMPLETE WITH LUMASON

## 2017-05-01 PROCEDURE — 99239 HOSP IP/OBS DSCHRG MGMT >30: CPT | Performed by: INTERNAL MEDICINE

## 2017-05-01 PROCEDURE — 36415 COLL VENOUS BLD VENIPUNCTURE: CPT | Performed by: INTERNAL MEDICINE

## 2017-05-01 PROCEDURE — 93350 STRESS TTE ONLY: CPT | Mod: 26 | Performed by: INTERNAL MEDICINE

## 2017-05-01 PROCEDURE — 25000128 H RX IP 250 OP 636: Performed by: INTERNAL MEDICINE

## 2017-05-01 PROCEDURE — 93325 DOPPLER ECHO COLOR FLOW MAPG: CPT | Mod: 26 | Performed by: INTERNAL MEDICINE

## 2017-05-01 PROCEDURE — 93018 CV STRESS TEST I&R ONLY: CPT | Performed by: INTERNAL MEDICINE

## 2017-05-01 PROCEDURE — 93321 DOPPLER ECHO F-UP/LMTD STD: CPT | Mod: 26 | Performed by: INTERNAL MEDICINE

## 2017-05-01 PROCEDURE — 25000132 ZZH RX MED GY IP 250 OP 250 PS 637: Performed by: INTERNAL MEDICINE

## 2017-05-01 PROCEDURE — 93306 TTE W/DOPPLER COMPLETE: CPT | Mod: 26 | Performed by: INTERNAL MEDICINE

## 2017-05-01 PROCEDURE — 84484 ASSAY OF TROPONIN QUANT: CPT | Performed by: INTERNAL MEDICINE

## 2017-05-01 PROCEDURE — 93016 CV STRESS TEST SUPVJ ONLY: CPT | Performed by: INTERNAL MEDICINE

## 2017-05-01 RX ADMIN — SULFUR HEXAFLUORIDE 5 ML: KIT at 09:30

## 2017-05-01 RX ADMIN — LOSARTAN POTASSIUM 25 MG: 25 TABLET, FILM COATED ORAL at 09:56

## 2017-05-01 RX ADMIN — SULFUR HEXAFLUORIDE 4 ML: KIT at 13:27

## 2017-05-01 RX ADMIN — LEVOTHYROXINE SODIUM 125 MCG: 125 TABLET ORAL at 09:56

## 2017-05-01 RX ADMIN — ASPIRIN 325 MG ORAL TABLET 325 MG: 325 PILL ORAL at 09:56

## 2017-05-01 RX ADMIN — HEPARIN SODIUM 950 UNITS/HR: 10000 INJECTION, SOLUTION INTRAVENOUS at 12:03

## 2017-05-01 NOTE — DISCHARGE SUMMARY
DATE OF ADMISSION:  04/30/2017.      DATE OF DISCHARGE:  05/01/2017.      DISCHARGE DIAGNOSES:   1.  Acute chest pain.   2.  Troponin elevation.   3.  Bicuspid aortic valve.   4.  Hypothyroidism.      DISCHARGE MEDICATIONS:   1.  Aspirin 81 mg p.o. daily.   2.  Losartan 25 mg p.o. daily.   3.  Vitamin B12 100 mcg p.o. daily.   4.  Synthroid 125 mcg p.o. daily.      HOSPITAL COURSE:  Kyle Lou is a pleasant 61-year-old man who presented with chest pain that radiated to the back as well as a near-syncopal event that occurred on 4/30/2017.  He says that it lasted about a minute and a half.  He was seen in the Emergency Department.  He had a CT angiogram of the chest which was negative for pulmonary embolism and negative for aortic dissection.  He was seen in consultation by Cardiology because he had a mild troponin elevation, although it was a very flat elevation of 0.048-0.51.  He was noted to have an elevated T4 and low TSH but informed us that his primary physician likes to have him be slightly hyperthyroid due to fatigue.  He did have an initial echocardiogram that was concerning for some possible wall motion abnormalities and for that reason the Cardiology Service suggested an angiogram.  He was very adamant that he did not want an angiogram and thus a stress echocardiogram was performed instead.  Interestingly the stress echocardiogram was performed and did not show any wall motion abnormalities and he actually achieved 17 mets with a normal BP response to exercise.  He exercised for 13 minutes.  His ejection fraction is preserved at 55-60%.  There is no evidence of any stress-induced ischemia.  The EKG portion was similarly unremarkable.  I discussed the case with Dr. Cortez of Cardiology, the day of discharge, he is not sure what to think of the initial wall motion abnormalities on the first echo but as the stress echo has not shown any wall motion abnormalities or significant findings he feels it is  appropriate the patient was discharged home.  He incidentally has an appointment with his cardiologist, Dr. Ray of [facility], this Friday and will have him follow up with him then.      PHYSICAL EXAMINATION:   GENERAL:  On exam today, Mr. Stewart feels well.  Now hemodynamically stable, oxygenating well on room air.   RESPIRATORY:  Clear bilaterally.   CARDIAC:  Regular rate and rhythm, S1, S2 are heard, no murmurs.   ABDOMEN:  Soft, nontender, nondistended.  Bowel sounds are heard.   EXTREMITIES:  Lower extremity exam is without significant pedal edema.        All of his questions were answered to his satisfaction on the day of discharge.  He should follow up with Dr. Ray of [facility] within 1 week and with his primary care physician, Dr. Jai Spence as needed.      TOTAL TIME SPENT:  Physician time today is 40 minutes.         SANDRA SHORE MD             D: 2017 16:41   T: 2017 16:58   MT: #[id number]      Name:     JOMAR STEWART   MRN:      [id number]        Account:        [id number]   :      1955           Admit Date:                                       Discharge Date:       Document: [id number]       cc: Jai Ray MD

## 2017-05-01 NOTE — CONSULTS
CARDIOVASCULAR CONSULTATION:        DATE OF CONSULTATION:  04/30/2017 at 6:00 p.m.      REQUESTING PHYSICIAN:   None stated.      CHIEF COMPLAINT:  Back pain radiating to the chest associated with diaphoresis and near-syncope and borderline elevated serum troponin.      HISTORY OF PRESENT ILLNESS:  I had the pleasure evaluating Mr. Kyle Lou for an episode of back pain that radiated to his chest associated with near-syncope with a borderline elevated serum troponin.  Mr. Lou is a very pleasant 61-year-old gentleman, accompanied by his wife, who carries a past medical history notable for bicuspid aortic valve disease, hypertension, hypothyroidism and myopia.      Mr. Lou was at [facility] earlier today when he developed acute onset 8/10, sharp back pain.  This pain then radiated to his anterior chest wall.  The episode lasted approximately 2 minutes in duration.  It was also associated with drenching diaphoresis.  His vision then got blurry and he felt as if he was going to syncopize.  Subsequently, the symptoms abated on their own and he was brought to the Emergency Department for further evaluation.      In the Emergency Department as part of his assessment, he underwent a CT scan of his chest which showed that there was no evidence of pulmonary embolism and that there was no evidence of aortic dissection.  A 12-lead ECG was obtained which showed sinus bradycardia without any evidence of ischemia.  Initial troponin was elevated just above the upper limit of normal at 0.048.      The patient has no prior cardiac history and is actually quite active.  He exercises 4 days a week for about an hour at a time.  He divides this in between cycling and rowing and weight lifting.  He has never had chest pain or dyspnea with his usual exercise routine.      ALLERGIES:  No known drug allergies.      HOME MEDICATIONS:   1.  Vitamin B12.     2.  Synthroid 125 mcg daily.   3.  Losartan 25 mg daily.   4.  Aspirin 81 mg daily.       PAST MEDICAL HISTORY:   1.  History of bicuspid aortic valve disease and possibly aortic dilatation.   2.  Hypertension.   3.  Hypothyroidism.   4.  Myopia.      FAMILY HISTORY:  The patient is adopted and is not aware of his family history.      SOCIAL HISTORY:  The patient has a very short duration of smoking about 1/4 pack per day for 4 years when he was young.  He has not smoked since 1984.  Denies drug and alcohol abuse.      REVIEW OF SYSTEMS:  A 10-point review of systems performed and is negative unless otherwise mentioned in the HPI.      PHYSICAL EXAMINATION:   VITAL SIGNS:  Temperature 97.8, pulse 55, respiratory rate 16, blood pressure 126/77, oxygen saturation 100% on room air.   GENERAL:  The patient is awake, alert, conversant and quite pleasant and in no acute distress.   HEENT:  Normocephalic, atraumatic.  Eyes:  No scleral icterus.   NECK:  Normal jugular venous pressure.   LUNGS:  Clear to auscultation bilaterally.   CARDIOVASCULAR:  S1, S2, and there is a subtle click audible.  I did not appreciate any murmurs, rubs or gallops.  Cardiac palpation was normal.   ABDOMEN:  Soft, nontender, nondistended, positive bowel sounds.   EXTREMITIES:  Warm and well perfused.   NEUROLOGIC:  No gross neurologic deficit.   PSYCHIATRIC:  Normal mood and affect.      DATA:  A CT scan of the chest with contrast notable for clear lungs, unremarkable heart, normal aorta without aortic dissection and no evidence of pulmonary embolus.  A 12-lead ECG shows sinus bradycardia at 49 beats per minute with first degree AV block (218 milliseconds) and left atrial enlargement.      LABORATORY DATA:  Notable for normal renal function and elevated serum troponin of 0.048.      ASSESSMENT AND PLAN:     1.  Acute onset back pain radiating to the chest.   2.  Borderline elevated serum troponin.   3.  History of bicuspid aortic valve disease.   4.  History of hypertension.   5.  History of hypothyroidism.      I had the pleasure  evaluating Mr. Jomar Stewart for an episode of chest pain and near-syncope that occurred at [facility].  He has had similar episodes of this type of back pain before that was short-lived, but usually associated with the sitting position.  Given his history of bicuspid aortic valve disease, my biggest concern would be aortic dissection.  In the Emergency Department, he underwent a CT scan which was notable for no evidence of aortic dissection.  He is also assessed for pulmonary emboli which was also negative.      His initial troponin was borderline elevated.  At this point in time, I think we should just continue to trend his troponins to ensure that he is not having an acute coronary syndrome.  He was initiated on heparin in the Emergency Department and given the fact that there is no evidence of dissection, I think we can continue that for the time being.  He is on standing aspirin as well.      In regards to his near-syncope,  the etiology is unclear.  It could have been a vagal reaction to his acute pain.  He denies palpitations but will keep him on telemetry to see if there are any dysrhythmias.      The primary service has already ordered a transthoracic echocardiogram to assess his aortic valve.  The patient states that he has this examined annually.  His ejection fraction is preserved, but he believes there may be a mild degree of aortic regurgitation.      Thank you for allowing me to participate in Mr. Stewart's care.  It was a true pleasure meeting him today and will continue to follow along.         SEYMOUR SALDIVAR MD             D: 2017 18:31   T: 2017 19:36   MT: CD      Name:     JOMAR STEWART   MRN:      [id number]        Account:       [id number]   :      1955           Consult Date:  2017      Document: [id number]

## 2017-05-01 NOTE — PLAN OF CARE
Problem: Goal Outcome Summary  Goal: Goal Outcome Summary  Outcome: No Change  A&Ox4. VSS on RA. Pt denies any pain/SOB/numbness/tingling. Tele shows SB/SR w/ 1st AVB. ECHO and stress test done. Heparin drip at 950 units/hr. Will continue to monitor.

## 2017-05-01 NOTE — PLAN OF CARE
Problem: Goal Outcome Summary  Goal: Goal Outcome Summary  Outcome: No Change  AA&Ox4, VSS, denies chest pains, stable on RA, SBA for BRP, has heparin running @ 950 units/hr. Plan for a stress test today. Continue to monitor.

## 2017-05-01 NOTE — PROVIDER NOTIFICATION
Pt discharge order received.reconfirmed about the Heparin discontinue to MD tenorio.Heparin discontinued as per MD tenorio.

## 2017-05-01 NOTE — PLAN OF CARE
Problem: Goal Outcome Summary  Goal: Goal Outcome Summary  Outcome: Improving  A&O. VSS. Tele SB and 1st degree av block.  No caffeine, cardiac diet. Up indpt. C/o minor back pain, thinks it is from bed not being comfortable.  Plan:  Cards consult tmrw.

## 2017-05-01 NOTE — PROGRESS NOTES
"[facility]  Cardiology Progress Note         Assessment and Plan:     ACS (acute coronary syndrome) (H)  Pt with on/off back pain, a little here today, no dizzy  Echo suggests subtle ant wall motion??  TFT suggests hyperthyroid on synthroid-pt states his pmd runs him a bit high due to sluggish last time it was decreased  Ct was neg for aorta dissection--i also looked at it no clear dissection but it was really gated for pulmonary circuit not aorta.  Bicuspid AV    I suggest heart cath given question of wma on echo--pt says NO (he says he has a friend that does cath and that person isn't available to talk to) second choice was nuc gxt but pt says NO MATTER WHAT HE HAS TO BE HOME TOMORROW AND WILL NOT STAY HERE   I will try for stress echo today. Pt told it has 80% accuracy. Pt aware of trivial + troponin and what it means             Interval History:   { minimal if any back pain, never had cp              Medications:   Scheduled Meds:     levothyroxine (SYNTHROID/LEVOTHROID) tablet 125 mcg  125 mcg Oral Daily     losartan  25 mg Oral Daily     sodium chloride (PF)  3 mL Intracatheter Q8H     aspirin  325 mg Oral Daily     PRN Meds: lidocaine, lidocaine 4%, sodium chloride (PF), HOLD MEDICATION, alum & mag hydroxide-simethicone, acetaminophen, acetaminophen, naloxone, - MEDICATION INSTRUCTIONS -, Continuing ACE inhibitor/ARB from home medication list OR ACE inhibitor/ARB order already placed during this visit, - MEDICATION INSTRUCTIONS -, - MEDICATION INSTRUCTIONS -, senna-docusate, bisacodyl, HYDROmorphone, ondansetron **OR** ondansetron         Physical Exam:   Blood pressure 123/71, pulse 60, temperature 98  F (36.7  C), temperature source Oral, resp. rate 18, height 1.905 m (6' 3\"), weight 80.7 kg (178 lb), SpO2 100 %.  Vitals:    04/30/17 1240 04/30/17 1500 05/01/17 0437   Weight: 79.4 kg (175 lb) 81.1 kg (178 lb 12.7 oz) 80.7 kg (178 lb)       Intake/Output Summary (Last 24 hours) at 05/01/17 " 1158  Last data filed at 17 0700   Gross per 24 hour   Intake            565.5 ml   Output              350 ml   Net            215.5 ml           Vital Sign Ranges  Temperature Temp  Av.9  F (36.6  C)  Min: 97.7  F (36.5  C)  Max: 98.3  F (36.8  C)   Blood pressure Systolic (24hrs), Av , Min:114 , Max:145        Diastolic (24hrs), Av, Min:55, Max:81      Pulse Pulse  Av.5  Min: 52  Max: 60   Respirations Resp  Av.7  Min: 16  Max: 18   Pulse oximetry SpO2  Av.2 %  Min: 97 %  Max: 100 %             Constitutional: Awake, alert, cooperative, no apparent distress  Tall thin       Skin: No rash, petechia, cyanosis       Neck: No thyromegaly or adenopathy       Lungs: clear       Cardiovasc: No murmur or rub       Abdomen: Normal bowel sounds, soft, non-distended, non-tender, no hepatomegaly       Neuro: Alert and oriented x3, non focal exam       Extremities: No edema       Other:             Data:     Recent Labs   Lab Test  17   1530  17   1258   WBC  7.6  5.9   HGB  13.7  14.4   MCV  93  93   PLT  137*  138*      Recent Labs   Lab Test  17   1258   NA  140   POTASSIUM  4.2   CHLORIDE  104   BUN  22   CR  0.93       Recent Labs  Lab 17  1258   *     Recent Labs   Lab Test  17   1258   ALT  16   AST  15     No components found for: TROPONINIES    No results found for: CHOL  No results found for: HDL  No results found for: LDL  No results found for: TRIG  No results found for: CHOLHDLRATIO       TSH   Date Value Ref Range Status   2017 0.07 (L) 0.40 - 4.00 mU/L Final

## 2017-05-01 NOTE — PLAN OF CARE
Problem: Goal Outcome Summary  Goal: Goal Outcome Summary  Outcome: Adequate for Discharge Date Met:  05/01/17  Pt is AXO X4.pt denies any chest pain and sob.Discharge instruction reviewed with pt.pt verbalized understanding.Pt was walked down with transport as pt refused to use wheelchair and wife will drive him home.

## 2017-05-02 ENCOUNTER — CARE COORDINATION (OUTPATIENT)
Dept: CARDIOLOGY | Facility: CLINIC | Age: 62
End: 2017-05-02

## 2017-05-02 NOTE — PROGRESS NOTES
Called patient and left  to discuss any post hospital d/c questions he may have, review medication changes, and confirm f/u appts. RN advised patient in  that he should f/u as scheduled with his primary cardiologist Dr. Ray at [facility]. Patient advised in  to call clinic with any cardiac related questions or concerns prior to his apt with Dr. Ray.

## 2017-05-03 ENCOUNTER — AMBULATORY - HEALTHEAST (OUTPATIENT)
Dept: CARDIOLOGY | Facility: CLINIC | Age: 62
End: 2017-05-03

## 2017-05-05 ENCOUNTER — OFFICE VISIT - HEALTHEAST (OUTPATIENT)
Dept: CARDIOLOGY | Facility: CLINIC | Age: 62
End: 2017-05-05

## 2017-05-05 DIAGNOSIS — I77.89 ENLARGED THORACIC AORTA (H): ICD-10-CM

## 2017-05-05 DIAGNOSIS — Q23.81 BICUSPID AORTIC VALVE: ICD-10-CM

## 2017-05-05 DIAGNOSIS — R07.2 PRECORDIAL PAIN: ICD-10-CM

## 2017-05-05 ASSESSMENT — MIFFLIN-ST. JEOR: SCORE: 1696.64

## 2018-01-08 ENCOUNTER — RECORDS - HEALTHEAST (OUTPATIENT)
Dept: ADMINISTRATIVE | Facility: OTHER | Age: 63
End: 2018-01-08

## 2018-01-08 ENCOUNTER — AMBULATORY - HEALTHEAST (OUTPATIENT)
Dept: CARDIOLOGY | Facility: CLINIC | Age: 63
End: 2018-01-08

## 2018-01-09 ENCOUNTER — OFFICE VISIT - HEALTHEAST (OUTPATIENT)
Dept: CARDIOLOGY | Facility: CLINIC | Age: 63
End: 2018-01-09

## 2018-01-09 DIAGNOSIS — Q23.81 BICUSPID AORTIC VALVE: ICD-10-CM

## 2018-01-09 DIAGNOSIS — I77.89 ENLARGED THORACIC AORTA (H): ICD-10-CM

## 2018-01-09 ASSESSMENT — MIFFLIN-ST. JEOR: SCORE: 1678.5

## 2018-01-31 ENCOUNTER — OFFICE VISIT (OUTPATIENT)
Dept: OPHTHALMOLOGY | Facility: CLINIC | Age: 63
End: 2018-01-31
Payer: COMMERCIAL

## 2018-01-31 DIAGNOSIS — H52.13 MYOPIA, BILATERAL: Primary | ICD-10-CM

## 2018-01-31 DIAGNOSIS — Z86.69 HISTORY OF DETACHED RETINA REPAIR: ICD-10-CM

## 2018-01-31 DIAGNOSIS — H25.049 POSTERIOR SUBCAPSULAR POLAR SENILE CATARACT, UNSPECIFIED LATERALITY: ICD-10-CM

## 2018-01-31 DIAGNOSIS — Z98.890 HISTORY OF DETACHED RETINA REPAIR: ICD-10-CM

## 2018-01-31 RX ORDER — MULTIPLE VITAMINS W/ MINERALS TAB 9MG-400MCG
1 TAB ORAL DAILY
COMMUNITY
End: 2022-02-03

## 2018-01-31 ASSESSMENT — REFRACTION_WEARINGRX
OS_CYLINDER: +0.75
OD_ADD: +2.25
OD_CYLINDER: +2.25
OD_SPHERE: -11.25
SPECS_TYPE: PAL
OS_ADD: +2.25
OD_AXIS: 077
OS_AXIS: 148
OS_SPHERE: -9.25

## 2018-01-31 ASSESSMENT — REFRACTION_MANIFEST
OS_CYLINDER: +1.00
OD_CYLINDER: +2.75
OS_SPHERE: -9.75
OS_AXIS: 155
OD_AXIS: 075
OD_SPHERE: -11.50

## 2018-01-31 ASSESSMENT — CUP TO DISC RATIO
OD_RATIO: 0.65
OS_RATIO: 0.65

## 2018-01-31 ASSESSMENT — VISUAL ACUITY
OD_CC: J2
OS_CC: J2
OS_CC: 20/25
CORRECTION_TYPE: GLASSES
OD_CC: 20/25
METHOD: SNELLEN - LINEAR

## 2018-01-31 ASSESSMENT — CONF VISUAL FIELD: OS_NORMAL: 1

## 2018-01-31 ASSESSMENT — TONOMETRY
OD_IOP_MMHG: 11
IOP_METHOD: ICARE
OS_IOP_MMHG: 12

## 2018-01-31 ASSESSMENT — EXTERNAL EXAM - RIGHT EYE: OD_EXAM: NORMAL

## 2018-01-31 ASSESSMENT — SLIT LAMP EXAM - LIDS
COMMENTS: NORMAL
COMMENTS: NORMAL

## 2018-01-31 ASSESSMENT — EXTERNAL EXAM - LEFT EYE: OS_EXAM: NORMAL

## 2018-01-31 NOTE — NURSING NOTE
Chief Complaints and History of Present Illnesses   Patient presents with     High Myopia Follow Up     No VA concerns     HPI    Affected eye(s):  Both   Symptoms:        Duration:  1 year   Frequency:  Constant       Do you have eye pain now?:  No      Comments:  No new floaters, denies  Flashes of light  No concerns  Clemencia MATA 2:16 PM January 31, 2018

## 2018-01-31 NOTE — MR AVS SNAPSHOT
After Visit Summary   2018    Kyle Lou    MRN: [id number]           Patient Information     Date Of Birth          1955        Visit Information        Provider Department      2018 2:00 PM Modesto Mcclellan MD [facility]        Today's Diagnoses     Myopia, bilateral    -  1    Posterior subcapsular polar senile cataract, unspecified laterality - Left Eye        History of detached retina repair - Right Eye           Follow-ups after your visit        Follow-up notes from your care team     Return in about 1 year (around 2019) for Complete Eye Exam, Cataract.      Who to contact     Please call your clinic at [phone] to:    Ask questions about your health    Make or cancel appointments    Discuss your medicines    Learn about your test results    Speak to your doctor   If you have compliments or concerns about an experience at your clinic, or if you wish to file a complaint, please contact [facility] Patient Relations at [phone] or email us at [email or web address]         Additional Information About Your Visit        MyChart Information     [employer] is an electronic gateway that provides easy, online access to your medical records. With [employer] you can request a clinic appointment, read your test results, renew a prescription or communicate with your care team.     To sign up for [employer] visit the website at [email or web address]   You will be asked to enter the access code listed below, as well as some personal information. Please follow the directions to create your username and password.     Your access code is: [email or web address]  Expires: 2018  6:30 AM     Your access code will  in 90 days. If you need help or a new code, please contact your [facility] Clinic or call [phone] for assistance.        Care EveryWhere ID     This is your Care EveryWhere  ID. This could be used by other organizations to access your [facility] medical records  [id number]         Blood Pressure from Last 3 Encounters:   05/01/17 121/73    Weight from Last 3 Encounters:   05/01/17 80.7 kg (178 lb)              Today, you had the following     No orders found for display       Primary Care Provider Office Phone # Fax #    Jonnathan Ramírez [phone]       [address] [address]        Equal Access to Services     [facility] : Hadii aad ku hadasho Soomaali, waaxda luqadaha, qaybta kaalmada adeegyada, waxay idiin hayaan adeeg kharaluke de paz . So [facility] [phone].    ATENCIÓN: Si habla español, tiene a patel disposición servicios gratuitos de asistencia lingüística. Llame al [phone].    We comply with applicable federal civil rights laws and [place] laws. We do not discriminate on the basis of race, color, national origin, age, disability, sex, sexual orientation, or gender identity.            Thank you!     Thank you for choosing [facility]  for your care. Our goal is always to provide you with excellent care. Hearing back from our patients is one way we can continue to improve our services. Please take a few minutes to complete the written survey that you may receive in the mail after your visit with us. Thank you!             Your Updated Medication List - Protect others around you: Learn how to safely use, store and throw away your medicines at www.disposemymeds.org.          This list is accurate as of 1/31/18 11:59 PM.  Always use your most recent med list.                   Brand Name Dispense Instructions for use Diagnosis    ASPIRIN PO      Take 81 mg by mouth daily        LOSARTAN POTASSIUM PO      Take 25 mg by mouth daily        multivitamin, therapeutic with minerals Tabs tablet      Take 1 tablet by mouth daily        SYNTHROID PO      Take 125 mcg by mouth daily

## 2018-02-07 NOTE — PROGRESS NOTES
Assessment & Plan      Kyle Lou is a 62 year old male with the following diagnoses:   (H52.13) Myopia, bilateral  (primary encounter diagnosis)  Comment: Change  Plan: Rx    (H25.049) Posterior subcapsular polar senile cataract, unspecified laterality - Left Eye  Comment: Mild  Plan: Follow    (Z98.890,  Z86.69) History of detached retina repair - Right Eye  Comment: Old, stable  Plan: Follow     -----------------------------------------------------------------------------------      Patient disposition:   Return in about 1 year (around 1/31/2019) for Complete Eye Exam, Cataract. or sooner as needed.    Complete documentation of historical and exam elements from today's encounter can  be found in the full encounter summary report (not reduplicated in this progress  note). I personally obtained the chief complaint(s) and history of present illness. I  confirmed and edited as necessary the review of systems, past medical/surgical  history, family history, social history, and examination findings as documented by  others; and I examined the patient myself. I personally reviewed the relevant tests,  images, and reports as documented above. I formulated and edited as necessary the  assessment and plan and discussed the findings and management plan with the  patient and family.    LINETTE Mcclellan M.D

## 2018-02-09 ENCOUNTER — HOSPITAL ENCOUNTER (OUTPATIENT)
Dept: MRI IMAGING | Facility: HOSPITAL | Age: 63
Discharge: HOME | End: 2018-02-09
Attending: INTERNAL MEDICINE

## 2018-02-09 DIAGNOSIS — Q23.81 BICUSPID AORTIC VALVE: ICD-10-CM

## 2018-02-09 DIAGNOSIS — I77.89 ENLARGED THORACIC AORTA (H): ICD-10-CM

## 2018-02-09 LAB
CREAT BLD-MCNC: 0.9 MG/DL
CREAT BLD-MCNC: 0.9 MG/DL (ref 0.7–1.3)
MR CARDIAC LEFT VENTRIAL CARDIAC INDEX: 42.3 L/MIN/M2 (ref 1.7–4.2)
MR CARDIAC LEFT VENTRICAL CARDIAC OUTPUT: 88 L/MIN (ref 2.8–8.8)
MR CARDIAC LEFT VENTRICULAR DIASTOLIC VOLUME INDEX: 65.3 ML/M2 (ref 47–92)
MR CARDIAC LEFT VENTRICULAR MASS INDEX: 4.14 G/M2 (ref 70–113)
MR CARDIAC LEFT VENTRICULAR MASS: 8.6 G (ref 118–238)
MR CARDIAC LEFT VENTRICULAR STROKE VOLUME INDEX: 42.32 ML/M2 (ref 32–62)
MR CARDIAC LEFT VENTRICULAR SYSTOLIC VOLUME INDEX: 22.99 ML/M2 (ref 13–30)
MR EJECTION FRACTION: 64.8 %
MR HEIGHT: 1.9 M
MR LEFT VENTRICULAR DYSTOLIC VOLUMEN: 135.8 ML (ref 77–195)
MR LEFT VENTRICULAR STROKE VOLUMEN: 88 ML (ref 51–133)
MR LEFT VENTRICULAR SYSTOLIC VOLUME: 47.8 ML (ref 19–72)
MR WEIGHT: 80.3 KG
POC GFR AMER AF HE - HISTORICAL: >60 ML/MIN/1.73M2
POC GFR NON AMER AF HE - HISTORICAL: >60 ML/MIN/1.73M2

## 2018-11-29 ENCOUNTER — COMMUNICATION - HEALTHEAST (OUTPATIENT)
Dept: ADMINISTRATIVE | Facility: CLINIC | Age: 63
End: 2018-11-29

## 2019-01-02 ENCOUNTER — OFFICE VISIT (OUTPATIENT)
Dept: OPHTHALMOLOGY | Facility: CLINIC | Age: 64
End: 2019-01-02
Payer: COMMERCIAL

## 2019-01-02 DIAGNOSIS — H25.049 POSTERIOR SUBCAPSULAR POLAR SENILE CATARACT, UNSPECIFIED LATERALITY: ICD-10-CM

## 2019-01-02 DIAGNOSIS — Z98.890 HISTORY OF DETACHED RETINA REPAIR: ICD-10-CM

## 2019-01-02 DIAGNOSIS — H25.13 NUCLEAR SCLEROTIC CATARACT OF BOTH EYES: ICD-10-CM

## 2019-01-02 DIAGNOSIS — H35.54 RPE (RETINAL PIGMENT EPITHELIUM) ATROPHY: ICD-10-CM

## 2019-01-02 DIAGNOSIS — Z86.69 HISTORY OF DETACHED RETINA REPAIR: ICD-10-CM

## 2019-01-02 DIAGNOSIS — H52.13 HIGH MYOPIA, BOTH EYES: Primary | ICD-10-CM

## 2019-01-02 ASSESSMENT — REFRACTION_WEARINGRX
OS_SPHERE: -9.50
OS_CYLINDER: +1.00
OD_SPHERE: -11.50
OS_ADD: +2.50
OD_CYLINDER: +2.75
SPECS_TYPE: PAL
OD_ADD: +2.50
OS_AXIS: 155
OD_AXIS: 075

## 2019-01-02 ASSESSMENT — REFRACTION_MANIFEST
OD_CYLINDER: +3.25
OD_SPHERE: -11.75
OD_AXIS: 075
OD_ADD: +2.50
OS_ADD: +2.50
OS_SPHERE: -10.00
OS_AXIS: 150
OS_CYLINDER: +0.75

## 2019-01-02 ASSESSMENT — VISUAL ACUITY
OS_CC+: -2
OD_CC: J1
METHOD: SNELLEN - LINEAR
OD_CC: 20/20
OS_CC: 20/20
CORRECTION_TYPE: GLASSES
OD_CC+: -2
OS_CC: J1

## 2019-01-02 ASSESSMENT — SLIT LAMP EXAM - LIDS
COMMENTS: NORMAL
COMMENTS: NORMAL

## 2019-01-02 ASSESSMENT — TONOMETRY
OD_IOP_MMHG: 10
IOP_METHOD: ICARE
OS_IOP_MMHG: 9

## 2019-01-02 ASSESSMENT — EXTERNAL EXAM - LEFT EYE: OS_EXAM: NORMAL

## 2019-01-02 ASSESSMENT — CONF VISUAL FIELD
OD_NORMAL: 1
OS_NORMAL: 1

## 2019-01-02 ASSESSMENT — CUP TO DISC RATIO
OS_RATIO: 0.5
OD_RATIO: 0.65

## 2019-01-02 ASSESSMENT — EXTERNAL EXAM - RIGHT EYE: OD_EXAM: NORMAL

## 2019-01-02 NOTE — PROGRESS NOTES
HPI  Kyle Lou is a 63 year old male here for comprehensive eye exam. Notes mild blurry vision and glare with night driving, which are stable.  No new flashes/floaters.      PMH:  Hypertension, hypothryoidism, multivitamin   POH:  Glasses for high myopia, rhegmatogenous retinal detachment status-post sbp (Dr. Be late 90s?), mild cataracts both eyes, no trauma  Oc Meds:  none  FH:  Denies any glaucoma, age related macular degeneration, or other known eye diseases       Assessment & Plan      (H52.13) High myopia, both eyes - Both Eyes  (primary encounter diagnosis)  Comment: mild change   Plan: manifest refraction done and prescription for glasses given to fill as needed     (H25.049) Posterior subcapsular polar senile cataract, unspecified laterality - Left Eye  (H25.13) Nuclear sclerotic cataract of both eyes - Both Eyes  Comment: mild not visually significant   Plan: follow    (Z98.890,  Z86.69) History of detached retina repair - Right Eye  Comment: stable retinal exam  Plan: reviewed signs and symptoms of flashes/floaters and to call with changes immediately    (H35.54) RPE (retinal pigment epithelium) atrophy - Both Eyes  Comment: mild both eyes- early age related macular degeneration? High myope as well  Plan: discussed with patient risk factors, will follow annually and macula oct if changes.     -----------------------------------------------------------------------------------    Patient disposition:   Return in about 1 year (around 1/2/2020) for Comprehensive Exam. Call for sooner appointment as needed.    Complete documentation of historical and exam elements from today's encounter can be found in the full encounter summary report (not reduplicated in this progress note). I personally obtained the chief complaint(s) and history of present illness.  I have confirmed and edited as necessary the CC, HPI, PMH/PSH, social history, FMH, ROS, and exam/neuro findings as obtained by the technician or  others. I have examined this patient myself and I personally viewed the image(s) and studies listed above and the documentation reflects my findings and interpretation.  I formulated and edited as necessary the assessment and plan and discussed the findings and management plan with the patient and family.     Mishel Ramey MD

## 2019-03-28 ENCOUNTER — AMBULATORY - HEALTHEAST (OUTPATIENT)
Dept: CARDIOLOGY | Facility: CLINIC | Age: 64
End: 2019-03-28

## 2019-03-28 ENCOUNTER — RECORDS - HEALTHEAST (OUTPATIENT)
Dept: ADMINISTRATIVE | Facility: OTHER | Age: 64
End: 2019-03-28

## 2019-04-11 ENCOUNTER — OFFICE VISIT - HEALTHEAST (OUTPATIENT)
Dept: CARDIOLOGY | Facility: CLINIC | Age: 64
End: 2019-04-11

## 2019-04-11 DIAGNOSIS — Q23.81 BICUSPID AORTIC VALVE: ICD-10-CM

## 2019-04-11 DIAGNOSIS — I77.89 ENLARGED THORACIC AORTA (H): ICD-10-CM

## 2019-04-11 ASSESSMENT — MIFFLIN-ST. JEOR: SCORE: 1648.56

## 2019-10-09 ENCOUNTER — HOSPITAL ENCOUNTER (OUTPATIENT)
Dept: MRI IMAGING | Facility: HOSPITAL | Age: 64
Discharge: HOME | End: 2019-10-09
Attending: INTERNAL MEDICINE

## 2019-10-09 ENCOUNTER — COMMUNICATION - HEALTHEAST (OUTPATIENT)
Dept: TELEHEALTH | Facility: CLINIC | Age: 64
End: 2019-10-09

## 2019-10-09 DIAGNOSIS — I77.89 ENLARGED THORACIC AORTA (H): ICD-10-CM

## 2019-10-09 DIAGNOSIS — Q23.81 BICUSPID AORTIC VALVE: ICD-10-CM

## 2019-10-11 LAB
MR CARDIAC LEFT VENTRIAL CARDIAC INDEX: 2.5 L/MIN/M2 (ref 1.7–4.2)
MR CARDIAC LEFT VENTRICAL CARDIAC OUTPUT: 5.2 L/MIN (ref 2.8–8.8)
MR CARDIAC LEFT VENTRICULAR DIASTOLIC VOLUME INDEX: 68.86 ML/M2 (ref 47–92)
MR CARDIAC LEFT VENTRICULAR MASS INDEX: 60.11 G/M2 (ref 70–113)
MR CARDIAC LEFT VENTRICULAR MASS: 123 G (ref 118–238)
MR CARDIAC LEFT VENTRICULAR STROKE VOLUME INDEX: 43.35 ML/M2 (ref 32–62)
MR CARDIAC LEFT VENTRICULAR SYSTOLIC VOLUME INDEX: 25.51 ML/M2 (ref 13–30)
MR EJECTION FRACTION: 62.95 %
MR HEIGHT: 1.9 M
MR LEFT VENTRICULAR DYSTOLIC VOLUMEN: 140.9 ML (ref 77–195)
MR LEFT VENTRICULAR STROKE VOLUMEN: 88.7 ML (ref 51–133)
MR LEFT VENTRICULAR SYSTOLIC VOLUME: 52.2 ML (ref 19–72)
MR WEIGHT: 77.3 KG

## 2019-11-20 NOTE — TELEPHONE ENCOUNTER
FUTURE VISIT INFORMATION      FUTURE VISIT INFORMATION:    Date: 1/7/20    Time: 8:20am    Location: [facility]  REFERRAL INFORMATION:    Referring provider:  self    Referring providers clinic:  N/A    Reason for visit/diagnosis  Eye exam    RECORDS REQUESTED FROM:       Clinic name Comments Records Status Imaging Status   [facility] eye OV/notes 10/28/14-1/2/19 EPIC

## 2019-12-20 NOTE — TELEPHONE ENCOUNTER
FUTURE VISIT INFORMATION      FUTURE VISIT INFORMATION:    Date: 1/21/20    Time: 8:20am    Location: [facility]  REFERRAL INFORMATION:    Referring provider:  Self    Referring providers clinic:  N/A    Reason for visit/diagnosis  Pt from [facility] with glasses for CEX.  Concerned about vision change increasing    RECORDS REQUESTED FROM:         Clinic name Comments Records Status Imaging Status   [facility] eye OV/notes 10/28/14-1/2/19 EPIC

## 2020-01-07 ENCOUNTER — PRE VISIT (OUTPATIENT)
Dept: OPHTHALMOLOGY | Facility: CLINIC | Age: 65
End: 2020-01-07

## 2020-01-21 ENCOUNTER — PRE VISIT (OUTPATIENT)
Dept: OPHTHALMOLOGY | Facility: CLINIC | Age: 65
End: 2020-01-21

## 2020-01-21 ENCOUNTER — OFFICE VISIT (OUTPATIENT)
Dept: OPHTHALMOLOGY | Facility: CLINIC | Age: 65
End: 2020-01-21

## 2020-01-21 DIAGNOSIS — H25.13 NUCLEAR SCLEROTIC CATARACT OF BOTH EYES: Primary | ICD-10-CM

## 2020-01-21 DIAGNOSIS — H25.049 POSTERIOR SUBCAPSULAR POLAR SENILE CATARACT, UNSPECIFIED LATERALITY: ICD-10-CM

## 2020-01-21 DIAGNOSIS — H52.13 HIGH MYOPIA, BOTH EYES: ICD-10-CM

## 2020-01-21 DIAGNOSIS — H35.54 RPE (RETINAL PIGMENT EPITHELIUM) ATROPHY: ICD-10-CM

## 2020-01-21 ASSESSMENT — REFRACTION_MANIFEST
OS_AXIS: 164
OS_CYLINDER: +1.00
OD_AXIS: 076
OS_ADD: +2.50
OS_SPHERE: -9.25
OD_CYLINDER: +2.50
OD_ADD: +2.50
OD_SPHERE: -11.50

## 2020-01-21 ASSESSMENT — REFRACTION_WEARINGRX
OD_ADD: +2.50
OS_CYLINDER: +1.00
SPECS_TYPE: PAL
OD_CYLINDER: +2.75
OS_SPHERE: -9.50
OD_AXIS: 075
OD_SPHERE: -11.50
OS_ADD: +2.50
OS_AXIS: 155

## 2020-01-21 ASSESSMENT — VISUAL ACUITY
CORRECTION_TYPE: GLASSES
METHOD: SNELLEN - LINEAR
OS_CC: 20/20
OD_CC: 20/25
OS_CC+: -1

## 2020-01-21 ASSESSMENT — CONF VISUAL FIELD
OS_NORMAL: 1
OD_NORMAL: 1

## 2020-01-21 ASSESSMENT — TONOMETRY
OS_IOP_MMHG: 10
OD_IOP_MMHG: 10
IOP_METHOD: TONOPEN

## 2020-01-21 ASSESSMENT — EXTERNAL EXAM - LEFT EYE: OS_EXAM: NORMAL

## 2020-01-21 ASSESSMENT — CUP TO DISC RATIO
OD_RATIO: 0.65
OS_RATIO: 0.5

## 2020-01-21 ASSESSMENT — SLIT LAMP EXAM - LIDS
COMMENTS: NORMAL
COMMENTS: NORMAL

## 2020-01-21 ASSESSMENT — EXTERNAL EXAM - RIGHT EYE: OD_EXAM: NORMAL

## 2020-01-21 NOTE — PROGRESS NOTES
HPI  Kyle Lou is a 64 year old male here for comprehensive eye exam. Notes mild blurry vision and glare with night driving, which are stable.  No new flashes/floaters.      PMH:  Hypertension, hypothryoidism, multivitamin   POH:  Glasses for high myopia, rhegmatogenous retinal detachment status-post sbp (Dr. Be late 90s?), mild cataracts both eyes, no trauma  Oc Meds:  none  FH:  Denies any glaucoma, age related macular degeneration, or other known eye diseases       Assessment & Plan      (H25.13) Nuclear sclerotic cataract of both eyes - Both Eyes  (primary encounter diagnosis)  (H25.049) Posterior subcapsular polar senile cataract, unspecified laterality - Left Eye  Comment: mild not visually significant   Plan: follow    (H52.13) High myopia, both eyes - Both Eyes  Comment: mild change   Plan: manifest refraction done and prescription for glasses given to fill as needed     (H35.54) RPE (retinal pigment epithelium) atrophy - Both Eyes  Comment: mild both eyes- early age related macular degeneration? High myope as well  Plan: discussed with patient risk factors, will follow annually and macula oct if changes.      -----------------------------------------------------------------------------------    Patient disposition:   Return in about 1 year (around 1/21/2021) for Comprehensive Exam, , OCT macula OU. Call for sooner appointment as needed.    Complete documentation of historical and exam elements from today's encounter can be found in the full encounter summary report (not reduplicated in this progress note). I personally obtained the chief complaint(s) and history of present illness.  I have confirmed and edited as necessary the CC, HPI, PMH/PSH, social history, FMH, ROS, and exam/neuro findings as obtained by the technician or others. I have examined this patient myself and I personally viewed the image(s) and studies listed above and the documentation reflects my findings and interpretation.  I  formulated and edited as necessary the assessment and plan and discussed the findings and management plan with the patient and family.     Mishel Ramey MD

## 2020-01-21 NOTE — NURSING NOTE
Chief Complaints and History of Present Illnesses   Patient presents with     Annual Eye Exam     Chief Complaint(s) and History of Present Illness(es)     Annual Eye Exam     Laterality: both eyes              Comments     Pt. States that VA seems to have worsened since last year.  Has noticed more tearing BE for the last 6 months.  No flashes BE.  No floaters BE.  Natalia Brambila COT 7:57 AM January 21, 2020

## 2020-11-09 ENCOUNTER — AMBULATORY - HEALTHEAST (OUTPATIENT)
Dept: CARDIOLOGY | Facility: CLINIC | Age: 65
End: 2020-11-09

## 2020-11-09 ENCOUNTER — RECORDS - HEALTHEAST (OUTPATIENT)
Dept: ADMINISTRATIVE | Facility: OTHER | Age: 65
End: 2020-11-09

## 2020-11-13 ENCOUNTER — OFFICE VISIT - HEALTHEAST (OUTPATIENT)
Dept: CARDIOLOGY | Facility: CLINIC | Age: 65
End: 2020-11-13

## 2020-11-13 DIAGNOSIS — Q23.81 BICUSPID AORTIC VALVE: ICD-10-CM

## 2020-11-13 DIAGNOSIS — I77.89 ENLARGED THORACIC AORTA (H): ICD-10-CM

## 2021-02-02 ENCOUNTER — OFFICE VISIT (OUTPATIENT)
Dept: OPHTHALMOLOGY | Facility: CLINIC | Age: 66
End: 2021-02-02
Payer: COMMERCIAL

## 2021-02-02 DIAGNOSIS — H25.049 POSTERIOR SUBCAPSULAR POLAR SENILE CATARACT, UNSPECIFIED LATERALITY: ICD-10-CM

## 2021-02-02 DIAGNOSIS — H52.13 HIGH MYOPIA, BOTH EYES: Primary | ICD-10-CM

## 2021-02-02 DIAGNOSIS — H35.373 EPIRETINAL MEMBRANE (ERM), BILATERAL: ICD-10-CM

## 2021-02-02 PROCEDURE — 92015 DETERMINE REFRACTIVE STATE: CPT | Performed by: OPHTHALMOLOGY

## 2021-02-02 PROCEDURE — 92134 CPTRZ OPH DX IMG PST SGM RTA: CPT | Performed by: OPHTHALMOLOGY

## 2021-02-02 PROCEDURE — 92014 COMPRE OPH EXAM EST PT 1/>: CPT | Performed by: OPHTHALMOLOGY

## 2021-02-02 ASSESSMENT — REFRACTION_MANIFEST
OD_AXIS: 069
OD_ADD: +2.50
OS_AXIS: 155
OS_ADD: +2.50
OS_SPHERE: -9.25
OD_SPHERE: -11.25
OS_CYLINDER: +1.00
OD_CYLINDER: +2.50

## 2021-02-02 ASSESSMENT — VISUAL ACUITY
CORRECTION_TYPE: GLASSES
OD_CC: 20/20
METHOD: SNELLEN - LINEAR
OS_CC: 20/25
OS_CC+: -1
OD_CC+: -2

## 2021-02-02 ASSESSMENT — SLIT LAMP EXAM - LIDS
COMMENTS: NORMAL
COMMENTS: NORMAL

## 2021-02-02 ASSESSMENT — CUP TO DISC RATIO
OS_RATIO: 0.5
OD_RATIO: 0.65

## 2021-02-02 ASSESSMENT — REFRACTION_WEARINGRX
OD_AXIS: 075
OS_AXIS: 155
OD_SPHERE: -11.50
OS_CYLINDER: +1.00
OD_ADD: +2.50
OS_SPHERE: -9.50
SPECS_TYPE: PAL
OD_CYLINDER: +2.75
OS_ADD: +2.50

## 2021-02-02 ASSESSMENT — CONF VISUAL FIELD
OD_NORMAL: 1
METHOD: COUNTING FINGERS
OS_NORMAL: 1

## 2021-02-02 ASSESSMENT — EXTERNAL EXAM - RIGHT EYE: OD_EXAM: NORMAL

## 2021-02-02 ASSESSMENT — TONOMETRY
IOP_METHOD: ICARE
OD_IOP_MMHG: 9
OS_IOP_MMHG: 7

## 2021-02-02 ASSESSMENT — EXTERNAL EXAM - LEFT EYE: OS_EXAM: NORMAL

## 2021-02-02 NOTE — PROGRESS NOTES
HPI  Kyle Lou is a 65 year old male here for comprehensive eye exam. Notes occasional blurry vision and glare with night driving, which are stable.  No new flashes/floaters.      PMH:  Hypertension, hypothryoidism, multivitamin   POH:  Glasses for high myopia, rhegmatogenous retinal detachment status-post sbp (Dr. Be late 90s?), mild cataracts both eyes, no trauma  Oc Meds:  none  FH:  Denies any glaucoma, age related macular degeneration, or other known eye diseases       Assessment & Plan      (H52.13) High myopia, both eyes - Both Eyes  (primary encounter diagnosis)  Comment: small change left eye   Plan:      Manifest refraction done and prescription for glasses given to fill as needed     (H25.049) Posterior subcapsular polar senile cataract, unspecified laterality - Left Eye  Comment: mild not visually significant   Plan: follow    (H35.373) Epiretinal membrane (ERM), bilateral - Both Eyes  Comment: mild not visually significant   Plan: OCT Retina Spectralis OU (both eyes)- fovea normal both eyes epiretinal membrane is nasal both eyes   follow    -----------------------------------------------------------------------------------    Patient disposition:   Return in about 1 year (around 2/2/2022) for Comprehensive Exam. Call for sooner appointment as needed.    Complete documentation of historical and exam elements from today's encounter can be found in the full encounter summary report (not reduplicated in this progress note). I personally obtained the chief complaint(s) and history of present illness.  I have confirmed and edited as necessary the CC, HPI, PMH/PSH, social history, FMH, ROS, and exam/neuro findings as obtained by the technician or others. I have examined this patient myself and I personally viewed the image(s) and studies listed above and the documentation reflects my findings and interpretation.  I formulated and edited as necessary the assessment and plan and discussed the findings  and management plan with the patient and family.     Mishel Ramey MD

## 2021-02-02 NOTE — NURSING NOTE
Chief Complaints and History of Present Illnesses   Patient presents with     COMPREHENSIVE EYE EXAM     Chief Complaint(s) and History of Present Illness(es)     COMPREHENSIVE EYE EXAM     Laterality: both eyes    Onset: years ago    Frequency: constantly    Course: stable    Treatments tried: no treatments    Pain scale: 0/10              Comments     Annual exam. Pt states vision is same, occasional blurry vision. Night vision seems the same. No pain. No drops.    H/o Retinal detachment    ESPERANZA Whitaker COT 2:00 PM February 2, 2021

## 2021-05-21 ENCOUNTER — HOSPITAL ENCOUNTER (OUTPATIENT)
Dept: MRI IMAGING | Facility: HOSPITAL | Age: 66
Discharge: HOME | End: 2021-05-21
Attending: INTERNAL MEDICINE
Payer: COMMERCIAL

## 2021-05-21 DIAGNOSIS — I77.89 ENLARGED THORACIC AORTA (H): ICD-10-CM

## 2021-05-21 DIAGNOSIS — Q23.81 BICUSPID AORTIC VALVE: ICD-10-CM

## 2021-05-21 LAB
CCTA EJECTION FRACTION: 70 %
CCTA INTERVENTRICULAR SETPUM: 1 CM (ref 0.6–1.1)
CCTA LEFT INTERNAL DIMENSION IN SYSTOLE: 2.4 CM (ref 2.1–4)
CCTA LEFT VENTRICULAR INTERNAL DIMENSION IN DIASTOLE: 4.1 CM (ref 3.5–6)
CCTA LEFT VENTRICULAR MASS: 114.13 G
CCTA POSTERIOR WALL: 0.8 CM (ref 0.6–1.1)
MR CARDIAC LEFT VENTRIAL CARDIAC INDEX: 2.6 L/MIN/M2 (ref 1.7–4.2)
MR CARDIAC LEFT VENTRICAL CARDIAC OUTPUT: 5.3 L/MIN (ref 2.8–8.8)
MR CARDIAC LEFT VENTRICULAR DIASTOLIC VOLUME INDEX: 56.28 ML/M2 (ref 47–92)
MR CARDIAC LEFT VENTRICULAR MASS INDEX: 2.59 G/M2 (ref 70–113)
MR CARDIAC LEFT VENTRICULAR MASS: 5.3 G (ref 118–238)
MR CARDIAC LEFT VENTRICULAR STROKE VOLUME INDEX: 36.71 ML/M2 (ref 32–62)
MR CARDIAC LEFT VENTRICULAR SYSTOLIC VOLUME INDEX: 19.58 ML/M2 (ref 13–30)
MR EJECTION FRACTION: 65.22 %
MR HEIGHT: 1.91 M
MR LEFT VENTRICULAR DYSTOLIC VOLUMEN: 115 ML (ref 77–195)
MR LEFT VENTRICULAR STROKE VOLUMEN: 75 ML (ref 51–133)
MR LEFT VENTRICULAR SYSTOLIC VOLUME: 40 ML (ref 19–72)
MR WEIGHT: 76.7 KG

## 2021-05-31 VITALS — WEIGHT: 177 LBS | BODY MASS INDEX: 22.01 KG/M2 | HEIGHT: 75 IN

## 2021-05-31 VITALS — WEIGHT: 181 LBS | HEIGHT: 75 IN | BODY MASS INDEX: 22.5 KG/M2

## 2021-06-03 VITALS — WEIGHT: 170.4 LBS | BODY MASS INDEX: 21.19 KG/M2 | HEIGHT: 75 IN

## 2021-06-05 VITALS
BODY MASS INDEX: 21.12 KG/M2 | WEIGHT: 169 LBS | SYSTOLIC BLOOD PRESSURE: 118 MMHG | RESPIRATION RATE: 16 BRPM | HEART RATE: 61 BPM | OXYGEN SATURATION: 99 % | DIASTOLIC BLOOD PRESSURE: 68 MMHG

## 2021-06-25 NOTE — PROGRESS NOTES
Progress Notes by Javi Ray MD at 5/5/2017  8:10 AM     Author: Javi Ray MD Service: -- Author Type: Physician    Filed: 5/5/2017  8:34 AM Encounter Date: 5/5/2017 Status: Signed    : Javi Ray MD (Physician)           Click to link to [facility] HEART [facility] NOTE    Thank you, Dr. Ramírez, for asking us to see Kyle Lou at the [facility].      Assessment/Recommendations   Patient with chest discomfort of unknown etiology.  It is very comforting that he had a normal stress echocardiogram.  His CT angiogram in 2012 did not show plaque and he had a calcium score of 0 at that time.    I do not think further cardiac evaluation is required at this time and I have asked him to go back to his regular exercise routine.  He will call if he has any further symptomatology.    We will schedule an MR of his heart and thoracic aorta in the late fall of this year for routine follow-up of his ascending aorta and bicuspid aortic valve.    Thank you for allowing us to participate in his care.         History of Present Illness    Mr. Kyle Lou is a 61 y.o. male with known bicuspid aortic valve as well as mild enlargement of the ascending aorta.  We have been following his aorta and his ascending aorta is been stable as of July 2016.  He recently had some chest discomfort which was quite dramatic.  He has back spasms at times but this came around to the front of his chest.  He was not physically active when it started.  He was trying to get in a different position but it also came to the front of his chest was rather sharp in nature and was associated with diaphoresis which was dramatic.  He did go to [facility].  He had an echocardiogram and there was a suspicion of a wall motion abnormality.  He had a CT and there was no evidence for pulmonary embolus and there was no evidence for aortic dissection.  The official report is reviewed and  there is no comment on the size of the ascending aorta.  He had a stress echocardiogram which did not show regional wall motion normalities, he had excellent exercise tolerance and he did not develop any new regional wall motion at [facility] after exercise.  He was therefore discharged.  He has worked out since that time and has not had any recurrent symptoms.  He denies orthopnea, paroxysmal nocturnal dyspnea, peripheral edema, syncope or near syncopal episodes.  Overall he is feeling well.         Physical Examination Review of Systems   Vitals:    05/05/17 0808   BP: 124/70   Pulse: (!) 56   Resp: 16     Body mass index is 22.62 kg/(m^2).  Wt Readings from Last 3 Encounters:   05/05/17 181 lb (82.1 kg)   02/15/16 185 lb (83.9 kg)   02/18/15 183 lb (83 kg)     General Appearance:   Alert, cooperative and in no acute distress.   ENT/Mouth: Oral mucuos membranes pink and moist .      EYES:  No scleral icterus. No Xanthelasma.    Neck: JVP normal. No Hepatojugular reflux. Thyroid not visualizedly   Chest/Lungs:   Lungs are clear to auscultation, equal chest wall expansion    Cardiovascular:   S1, S2 with short systolic murmur , no clicks or rubs. Brachial, radial and posterior tibial pulses are intact and symetric. No carotid bruits noted   Abdomen:  Nontender. BS+.       Extremities: No cyanosis, clubbing or edema   Skin: no xanthelasma, warm.    Psych: Appropriate affect.   Neurologic: normal gait, normal  bilateral, no tremors        General: WNL  Eyes: WNL  Ears/Nose/Throat: WNL  Lungs: WNL  Heart: WNL  Stomach: WNL  Bladder: WNL  Muscle/Joints: WNL  Skin: WNL  Nervous System: WNL  Mental Health: WNL     Blood: WNL     Medical History  Surgical History Family History Social History   History reviewed. No pertinent past medical history. History reviewed. No pertinent surgical history. History reviewed. No pertinent family history. Social History     Social History   ? Marital status:      Spouse name:  N/A   ? Number of children: N/A   ? Years of education: N/A     Occupational History   ? Not on file.     Social History Main Topics   ? Smoking status: Former Smoker   ? Smokeless tobacco: Never Used   ? Alcohol use Not on file   ? Drug use: No   ? Sexual activity: Not on file     Other Topics Concern   ? Not on file     Social History Narrative          Medications  Allergies   Current Outpatient Prescriptions   Medication Sig Dispense Refill   ? aspirin 81 MG EC tablet Take 81 mg by mouth daily.     ? CYANOCOBALAMIN, VITAMIN B-12, (VITAMIN B-12 ORAL) Take 1 tablet by mouth daily.     ? levothyroxine (SYNTHROID, LEVOTHROID) 125 MCG tablet Take 125 mcg by mouth daily.     ? losartan (COZAAR) 25 MG tablet Take 25 mg by mouth daily.     ? omega-3 fatty acids-fish oil (FISH OIL EXTRA STRENGTH) 435-880 mg cap Take 1 tablet by mouth daily.       No current facility-administered medications for this visit.       No Known Allergies      Lab Results    Chemistry/lipid CBC Cardiac Enzymes/BNP/TSH/INR   No results found for: CHOL, HDL, LDLCALC, TRIG, CREATININE, BUN, K, NA, CL, CO2 No results found for: WBC, HGB, HCT, MCV, PLT No results found for: CKTOTAL, CKMB, CKMBINDEX, TROPONINI, BNP, TSH, INR

## 2021-06-26 NOTE — PROGRESS NOTES
Progress Notes by Javi Ray MD at 1/9/2018  4:30 PM     Author: Javi Ray MD Service: -- Author Type: Physician    Filed: 1/9/2018  5:05 PM Encounter Date: 1/9/2018 Status: Signed    : Javi Ray MD (Physician)           Click to link to [facility] HEART [facility] NOTE    Thank you, Dr. Ramírez, for asking us to see Kyle Lou at the [facility].      Assessment/Recommendations   Patient with known bicuspid aortic valve, mild enlargement of the ascending aorta who is not had imaging studies for over one year.  His functional capacity is stable, he does not have any new physical findings, and he is physically active.    I have not recommended any changes in his current medications.  I have recommended a repeat cardiac MRI and ascending aortic MRI to evaluate his valve and ascending aorta.  We will obtain that in the near future.    I have encouraged him to maintain an active lifestyle and to call if he has changes in his functional capacity.    Thank you for allowing us to participate in his care.         History of Present Illness    Mr. Kyle Lou is a 62 y.o. male with known bicuspid aortic valve and mild enlargement of his ascending aorta.  He was evaluated in 2017 for chest discomfort with a normal stress echocardiogram and he has had no recurrence of this chest or back discomfort.  He has been exercising on a regular basis and does aerobic activity with elliptical, stairmaster, treadmill and after the first of the year has worked with a  on 2 occasions.  He feels like his functional capacity is quite stable.  He does not have unusual shortness of breath with activity.  He denies orthopnea, paroxysmal nocturnal dyspnea, gets some lines in his legs with his socks being taught but no significant peripheral edema to his knowledge.  He has not had palpitations, syncope or near syncopal episodes.  Recent lipid panel with his  primary care physician was excellent.  LDL cholesterol was in the low 70s.         Physical Examination Review of Systems   Vitals:    01/09/18 1622   BP: 112/75   Pulse: (!) 52   Resp: 16   SpO2: 98%     Body mass index is 22.12 kg/(m^2).  Wt Readings from Last 3 Encounters:   01/09/18 177 lb (80.3 kg)   05/05/17 181 lb (82.1 kg)   02/15/16 185 lb (83.9 kg)     General Appearance:   Alert, cooperative and in no acute distress.   ENT/Mouth: Oral mucuos membranes pink and moist .      EYES:  No scleral icterus. No Xanthelasma.    Neck: JVP normal. No Hepatojugular reflux. Thyroid not visualized   Chest/Lungs:   Lungs are clear to auscultation, equal chest wall expansion    Cardiovascular:   S1, S2 with 1/6 diastolic murmur heard at the left sternal border, no clicks or rubs. Brachial, radial and posterior tibial pulses are intact and symetric. No carotid bruits noted   Abdomen:  Nontender. BS+.       Extremities: No cyanosis, clubbing or edema   Skin: no xanthelasma, warm.    Psych: Appropriate affect.   Neurologic: normal gait, normal  bilateral, no tremors        General: WNL  Eyes: WNL  Ears/Nose/Throat: WNL  Lungs: WNL  Heart: WNL  Stomach: WNL  Bladder: WNL  Muscle/Joints: WNL  Skin: WNL  Nervous System: WNL  Mental Health: WNL     Blood: WNL     Medical History  Surgical History Family History Social History   No past medical history on file. No past surgical history on file. No family history on file. Social History     Social History   ? Marital status:      Spouse name: N/A   ? Number of children: N/A   ? Years of education: N/A     Occupational History   ? Not on file.     Social History Main Topics   ? Smoking status: Former Smoker   ? Smokeless tobacco: Never Used   ? Alcohol use Not on file   ? Drug use: No   ? Sexual activity: Not on file     Other Topics Concern   ? Not on file     Social History Narrative          Medications  Allergies   Current Outpatient Prescriptions   Medication Sig  Dispense Refill   ? aspirin 81 MG EC tablet Take 81 mg by mouth daily.     ? levothyroxine (SYNTHROID, LEVOTHROID) 125 MCG tablet Take 125 mcg by mouth daily.     ? losartan (COZAAR) 25 MG tablet Take 25 mg by mouth daily.     ? CYANOCOBALAMIN, VITAMIN B-12, (VITAMIN B-12 ORAL) Take 1 tablet by mouth daily.     ? omega-3 fatty acids-fish oil (FISH OIL EXTRA STRENGTH) 435-880 mg cap Take 1 tablet by mouth daily.       No current facility-administered medications for this visit.       No Known Allergies      Lab Results    Chemistry/lipid CBC Cardiac Enzymes/BNP/TSH/INR   No results found for: CHOL, HDL, LDLCALC, TRIG, CREATININE, BUN, K, NA, CL, CO2 No results found for: WBC, HGB, HCT, MCV, PLT No results found for: CKTOTAL, CKMB, CKMBINDEX, TROPONINI, BNP, TSH, INR

## 2021-06-27 NOTE — PROGRESS NOTES
Progress Notes by Javi Ray MD at 4/11/2019  3:50 PM     Author: Javi Ray MD Service: -- Author Type: Physician    Filed: 4/11/2019  4:24 PM Encounter Date: 4/11/2019 Status: Signed    : Javi Ray MD (Physician)           Click to link to [facility] NOTE    Thank you, Dr. Ramírez, for asking us to see Kyle Lou at the [facility].      Assessment/Recommendations   Patient with known bicuspid aortic valve, mild enlargement of the ascending aorta with a good functional capacity.  He is cardiac MRI and thoracic MRI in February 2018 did not show any changes.  His ascending aorta has been stable from a size standpoint for 6 years.    I recommended that we repeat a cardiac MR in about 6 months.  We will check the thoracic aorta, as well as the aortic valve at that time.  We will also build to see his left ventricular systolic function.    We will call him with results and any further recommendations we will plan on seeing him in 1 year.    We reviewed the symptoms of shortness of breath, exercise changes, chest pain in the classic chest pains for aortic dissection today.    Thank you for allowing us to participate in his care.         History of Present Illness    Mr. Kyle Lou is a 63 y.o. male with known bicuspid aortic valve and mild enlargement of the ascending aorta.  In 2012 his ascending aorta was measured at 4.2 cm.  In February 2018, his ascending aorta was measured at 4.2 cm.  He is felt well this past year.  He feels like his functional capacity is slightly diminished but he admits that he is not been working out his upper body as much this winter because of the weather and more time working out at home as opposed to going to a gym.  He denies unusual shortness of breath with activity and also denies orthopnea, paroxysmal nocturnal dyspnea, peripheral edema, syncope or near syncopal episodes.  He will note that his  heart rate will increase a bit but mostly when he is exercising and then it will come down fairly quickly.  He does not have any lightheadedness, syncope or near syncopal episodes with this.  He has not had any types of chest discomfort of late.  Prior echocardiograms and cardiac MR did not show significant mitral stenosis or insufficiency.         Physical Examination Review of Systems   Vitals:    04/11/19 1532   BP: 110/70   Pulse: 72   Resp: 16     Body mass index is 21.3 kg/m .  Wt Readings from Last 3 Encounters:   04/11/19 170 lb 6.4 oz (77.3 kg)   01/09/18 177 lb (80.3 kg)   05/05/17 181 lb (82.1 kg)     General Appearance:   Alert, cooperative and in no acute distress.   ENT/Mouth: Oral mucuos membranes pink and moist .      EYES:  No scleral icterus. No Xanthelasma.    Neck: JVP normal. No Hepatojugular reflux. Thyroid not visualized   Chest/Lungs:   Lungs are clear to auscultation, equal chest wall expansion.  Pectus deformity   Cardiovascular:   S1, S2 with 2/6 systolic murmur , no clicks or rubs. Brachial, radial and posterior tibial pulses are intact and symetric. No carotid bruits noted   Abdomen:  Nontender. BS+.       Extremities: No cyanosis, clubbing or edema   Skin: no xanthelasma, warm.    Psych: Appropriate affect.   Neurologic: normal gait, normal  bilateral, no tremors        General: WNL  Eyes: WNL  Ears/Nose/Throat: WNL  Lungs: WNL  Heart: Irregular Heartbeat  Stomach: WNL  Bladder: WNL  Muscle/Joints: WNL  Skin: WNL  Nervous System: WNL  Mental Health: WNL     Blood: WNL     Medical History  Surgical History Family History Social History   No past medical history on file. No past surgical history on file. No family history on file. Social History     Socioeconomic History   ? Marital status:      Spouse name: Not on file   ? Number of children: Not on file   ? Years of education: Not on file   ? Highest education level: Not on file   Occupational History   ? Not on file   Social  Needs   ? Financial resource strain: Not on file   ? Food insecurity:     Worry: Not on file     Inability: Not on file   ? Transportation needs:     Medical: Not on file     Non-medical: Not on file   Tobacco Use   ? Smoking status: Former Smoker   ? Smokeless tobacco: Never Used   Substance and Sexual Activity   ? Alcohol use: Not on file   ? Drug use: No   ? Sexual activity: Not on file   Lifestyle   ? Physical activity:     Days per week: Not on file     Minutes per session: Not on file   ? Stress: Not on file   Relationships   ? Social connections:     Talks on phone: Not on file     Gets together: Not on file     Attends Druze service: Not on file     Active member of club or organization: Not on file     Attends meetings of clubs or organizations: Not on file     Relationship status: Not on file   ? Intimate partner violence:     Fear of current or ex partner: Not on file     Emotionally abused: Not on file     Physically abused: Not on file     Forced sexual activity: Not on file   Other Topics Concern   ? Not on file   Social History Narrative   ? Not on file          Medications  Allergies   Current Outpatient Medications   Medication Sig Dispense Refill   ? aspirin 81 MG EC tablet Take 81 mg by mouth daily.     ? CYANOCOBALAMIN, VITAMIN B-12, (VITAMIN B-12 ORAL) Take 1 tablet by mouth daily.     ? Lactobacillus rhamnosus GG (CULTURELLE) 15 billion cell CpSP Take 1 capsule by mouth daily.     ? levothyroxine (SYNTHROID, LEVOTHROID) 125 MCG tablet Take 125 mcg by mouth daily.     ? losartan (COZAAR) 25 MG tablet Take 25 mg by mouth daily.     ? omega-3 fatty acids-fish oil (FISH OIL EXTRA STRENGTH) 435-880 mg cap Take 1 tablet by mouth daily.       No current facility-administered medications for this visit.       No Known Allergies      Lab Results    Chemistry/lipid CBC Cardiac Enzymes/BNP/TSH/INR   No results found for: CHOL, HDL, LDLCALC, TRIG, CREATININE, BUN, K, NA, CL, CO2 No results found for:  WBC, HGB, HCT, MCV, PLT No results found for: CKTOTAL, CKMB, CKMBINDEX, TROPONINI, BNP, TSH, INR

## 2021-06-29 NOTE — PROGRESS NOTES
Progress Notes by Javi Rya MD at 11/13/2020  2:10 PM     Author: Javi Ray MD Service: -- Author Type: Physician    Filed: 11/13/2020  2:59 PM Encounter Date: 11/13/2020 Status: Signed    : Javi Ray MD (Physician)               Assessment/Recommendations   Patient with known bicuspid aortic valve, mild enlargement of the ascending aorta but stability in this regard for several years.  His last MRI was 1 year ago and we have been doing every 18-month follow-ups.  We will get a repeat MRI in about 6 months.  I have encouraged him to maintain an active lifestyle and to call if he has changes in his functional capacity.    Thank you for allowing us to participate in his care.       History of Present Illness/Subjective    Mr. Kyle Lou is a 64 y.o. male with known bicuspid aortic valve and mild enlargement of the ascending aorta.  He has been feeling well this past year.  He has not been exercising quite as much during Covid but has no functional limitations.  He denies chest discomfort, orthopnea, paroxysmal nocturnal dyspnea, peripheral edema, syncope, near syncope or palpitations.  His last cardiac MRI was about 1 year ago at which time the ascending aorta was mildly enlarged but stable and the bicuspid aortic valve had mild stenosis and mild insufficiency.  We have been doing every 18-month MRAs based on the stability.           Physical Examination Review of Systems   Vitals:    11/13/20 1421   BP: 118/68   Pulse: 61   Resp: 16   SpO2: 99%     Body mass index is 21.12 kg/m .  Wt Readings from Last 3 Encounters:   11/13/20 169 lb (76.7 kg)   04/11/19 170 lb 6.4 oz (77.3 kg)   01/09/18 177 lb (80.3 kg)     General Appearance:   Alert, cooperative and in no acute distress.   ENT/Mouth:  Patient wearing a mask.      EYES:  no scleral icterus, normal conjunctivae   Neck: JVP normal. No Hepatojugular reflux. Thyroid not visualized.   Chest/Lungs:   Lungs are clear to auscultation,  equal chest wall expansion.  Pectus excavatum   Cardiovascular:   S1, S2 without murmur ,clicks or rubs. Brachial, radial and posterior tibial pulses are intact and symetric. No carotid bruits noted   Abdomen:  Nontender. BS+. No bruits.   Extremities: No cyanosis, clubbing or edema   Skin: no xanthelasma, warm.    Neurologic: normal arm movement bilateral, no tremors     Psychiatric: Appropriate affect.      General: WNL  Eyes: WNL  Ears/Nose/Throat: WNL  Lungs: WNL  Heart: WNL  Stomach: WNL  Bladder: WNL  Muscle/Joints: WNL  Skin: WNL  Nervous System: WNL  Mental Health: WNL     Blood: WNL       Medical History  Surgical History Family History Social History   No past medical history on file. No past surgical history on file. No family history on file. Social History     Socioeconomic History   ? Marital status:      Spouse name: Not on file   ? Number of children: Not on file   ? Years of education: Not on file   ? Highest education level: Not on file   Occupational History   ? Not on file   Social Needs   ? Financial resource strain: Not on file   ? Food insecurity     Worry: Not on file     Inability: Not on file   ? Transportation needs     Medical: Not on file     Non-medical: Not on file   Tobacco Use   ? Smoking status: Former Smoker   ? Smokeless tobacco: Never Used   Substance and Sexual Activity   ? Alcohol use: Not on file   ? Drug use: No   ? Sexual activity: Not on file   Lifestyle   ? Physical activity     Days per week: Not on file     Minutes per session: Not on file   ? Stress: Not on file   Relationships   ? Social connections     Talks on phone: Not on file     Gets together: Not on file     Attends Yazdanism service: Not on file     Active member of club or organization: Not on file     Attends meetings of clubs or organizations: Not on file     Relationship status: Not on file   ? Intimate partner violence     Fear of current or ex partner: Not on file     Emotionally abused: Not on  file     Physically abused: Not on file     Forced sexual activity: Not on file   Other Topics Concern   ? Not on file   Social History Narrative   ? Not on file          Medications  Allergies   Current Outpatient Medications   Medication Sig Dispense Refill   ? aspirin 81 MG EC tablet Take 81 mg by mouth daily.     ? Lactobacillus rhamnosus GG (CULTURELLE) 15 billion cell CpSP Take 1 capsule by mouth daily.     ? levothyroxine (SYNTHROID, LEVOTHROID) 125 MCG tablet Take 125 mcg by mouth daily.     ? losartan (COZAAR) 25 MG tablet Take 25 mg by mouth daily.     ? CYANOCOBALAMIN, VITAMIN B-12, (VITAMIN B-12 ORAL) Take 1 tablet by mouth daily.     ? omega-3 fatty acids-fish oil (FISH OIL EXTRA STRENGTH) 435-880 mg cap Take 1 tablet by mouth daily.       No current facility-administered medications for this visit.     No Known Allergies      Lab Results    Chemistry/lipid CBC Cardiac Enzymes/BNP/TSH/INR   No results found for: CHOL, HDL, LDLCALC, TRIG, CREATININE, BUN, K, NA, CL, CO2 No results found for: WBC, HGB, HCT, MCV, PLT No results found for: CKTOTAL, CKMB, CKMBINDEX, TROPONINI, BNP, TSH, INR

## 2022-02-03 ENCOUNTER — OFFICE VISIT (OUTPATIENT)
Dept: OPHTHALMOLOGY | Facility: CLINIC | Age: 67
End: 2022-02-03
Payer: COMMERCIAL

## 2022-02-03 DIAGNOSIS — H35.373 EPIRETINAL MEMBRANE (ERM), BILATERAL: ICD-10-CM

## 2022-02-03 DIAGNOSIS — H52.13 HIGH MYOPIA, BOTH EYES: Primary | ICD-10-CM

## 2022-02-03 DIAGNOSIS — H25.049 POSTERIOR SUBCAPSULAR POLAR SENILE CATARACT, UNSPECIFIED LATERALITY: ICD-10-CM

## 2022-02-03 PROCEDURE — 92014 COMPRE OPH EXAM EST PT 1/>: CPT | Performed by: OPHTHALMOLOGY

## 2022-02-03 PROCEDURE — 92015 DETERMINE REFRACTIVE STATE: CPT | Performed by: OPHTHALMOLOGY

## 2022-02-03 ASSESSMENT — CUP TO DISC RATIO
OS_RATIO: 0.4
OD_RATIO: 0.65

## 2022-02-03 ASSESSMENT — CONF VISUAL FIELD
OS_NORMAL: 1
METHOD: COUNTING FINGERS
OD_NORMAL: 1

## 2022-02-03 ASSESSMENT — REFRACTION_WEARINGRX
OS_AXIS: 155
SPECS_TYPE: PAL
OD_SPHERE: -11.50
OS_SPHERE: -9.50
OS_ADD: +2.50
OD_ADD: +2.50
OD_AXIS: 075
OS_CYLINDER: +1.00
OD_CYLINDER: +2.75

## 2022-02-03 ASSESSMENT — VISUAL ACUITY
OS_PH_CC: 20/20
OS_PH_CC+: -2
OS_CC: 20/30
METHOD: SNELLEN - LINEAR
CORRECTION_TYPE: GLASSES
OS_CC+: -2
OD_CC: 20/25
OD_PH_CC: 20/20

## 2022-02-03 ASSESSMENT — REFRACTION_MANIFEST
OD_ADD: +2.75
OD_CYLINDER: +2.50
OS_ADD: +2.75
OD_AXIS: 070
OD_SPHERE: -11.25
OS_CYLINDER: +1.25
OS_AXIS: 155
OS_SPHERE: -9.50

## 2022-02-03 ASSESSMENT — SLIT LAMP EXAM - LIDS
COMMENTS: NORMAL
COMMENTS: NORMAL

## 2022-02-03 ASSESSMENT — EXTERNAL EXAM - LEFT EYE: OS_EXAM: NORMAL

## 2022-02-03 ASSESSMENT — TONOMETRY
IOP_METHOD: TONOPEN
OS_IOP_MMHG: 10
OD_IOP_MMHG: 9

## 2022-02-03 ASSESSMENT — EXTERNAL EXAM - RIGHT EYE: OD_EXAM: NORMAL

## 2022-02-03 NOTE — NURSING NOTE
Chief Complaints and History of Present Illnesses   Patient presents with     COMPREHENSIVE EYE EXAM     Chief Complaint(s) and History of Present Illness(es)     COMPREHENSIVE EYE EXAM     Laterality: both eyes    Onset: unknown    Quality: difficulty focusing    Severity: moderate    Frequency: episodically    Timing: with computer use    Context: computer work and reading    Associated symptoms: floaters (historic - no changes ).  Negative for eye pain, flashes, discharge, itching, nausea and vomiting    Treatments tried: no treatments and glasses    Pain scale: 0/10              Comments     Pt here today for annual routine eye exam.  VIDA was 2/2/21 - no eye health concerns.  Pt states vision seems ok but when at computer pt has been experiencing difficulty focusing.    Ocular meds = none    Pee EUCEDA, LICHA 8:58 AM 02/03/2022

## 2022-02-03 NOTE — PROGRESS NOTES
HPI  Kyle Lou is a 66 year old male here for comprehensive eye exam. Vision stable both eyes.  With computer and reading has two episodes of difficulty focusing at distance for almost a minute after with both eyes simultaneously.  Eventually improved with blinking.  Floaters stable both eyes no flashes.      PMH:  Hypertension, hypothryoidism, multivitamin   POH:  Glasses for high myopia, rhegmatogenous retinal detachment status-post sbp (Dr. Be late 90s?), mild cataracts both eyes, no trauma  Oc Meds:  none  FH:  Denies any glaucoma, age related macular degeneration, or other known eye diseases       Assessment & Plan      (H52.13) High myopia, both eyes - Both Eyes  (primary encounter diagnosis)  Comment: small changes with manifest refraction yielding good visual acuity   Plan:      Manifest refraction done and prescription for glasses given to fill as needed, increase add slightly for more computer add in progressive add lenses     (H04.123) Dry eyes, bilateral - Both Eyes  Comment: mild, no cornea signs  Likely etiology of transient blurred vision both eyes (with near focus activities)  Plan: trial of artificial tear drops four times a day as needed, call with worsening or new symptoms     (H25.049) Posterior subcapsular polar senile cataract, unspecified laterality - Left Eye  Comment: mild not visually significant   Plan: follow    (H35.373) Epiretinal membrane (ERM), bilateral - Both Eyes  Comment: mild not visually significant, not in fovea both eyes (nasal)   Plan: follow clinically      -----------------------------------------------------------------------------------    Patient disposition:   Return in about 1 year (around 2/3/2023) for Comprehensive Exam. Call for sooner appointment as needed.    Complete documentation of historical and exam elements from today's encounter can be found in the full encounter summary report (not reduplicated in this progress note). I personally obtained the  chief complaint(s) and history of present illness.  I have confirmed and edited as necessary the CC, HPI, PMH/PSH, social history, FMH, ROS, and exam/neuro findings as obtained by the technician or others. I have examined this patient myself and I personally viewed the image(s) and studies listed above and the documentation reflects my findings and interpretation.  I formulated and edited as necessary the assessment and plan and discussed the findings and management plan with the patient and family.     Mishel Ramey MD

## 2022-11-11 ENCOUNTER — HOSPITAL ENCOUNTER (OUTPATIENT)
Dept: MRI IMAGING | Facility: HOSPITAL | Age: 67
Discharge: HOME | End: 2022-11-11
Attending: INTERNAL MEDICINE
Payer: COMMERCIAL

## 2022-11-11 DIAGNOSIS — I77.89 ENLARGED THORACIC AORTA (H): ICD-10-CM

## 2022-11-11 DIAGNOSIS — Q23.81 BICUSPID AORTIC VALVE: ICD-10-CM

## 2022-11-11 PROCEDURE — 75561 CARDIAC MRI FOR MORPH W/DYE: CPT | Mod: 26 | Performed by: INTERNAL MEDICINE

## 2022-11-11 PROCEDURE — 999N000122 MR MYOCARDIUM  OVERREAD

## 2022-11-11 PROCEDURE — A9585 GADOBUTROL INJECTION: HCPCS | Performed by: INTERNAL MEDICINE

## 2022-11-11 PROCEDURE — 255N000002 HC RX 255 OP 636: Performed by: INTERNAL MEDICINE

## 2022-11-11 PROCEDURE — 71555 MRI ANGIO CHEST W OR W/O DYE: CPT | Mod: 26 | Performed by: INTERNAL MEDICINE

## 2022-11-11 PROCEDURE — 75561 CARDIAC MRI FOR MORPH W/DYE: CPT

## 2022-11-11 RX ORDER — GADOBUTROL 604.72 MG/ML
15 INJECTION INTRAVENOUS ONCE
Status: COMPLETED | OUTPATIENT
Start: 2022-11-11 | End: 2022-11-11

## 2022-11-11 RX ADMIN — GADOBUTROL 15 ML: 604.72 INJECTION INTRAVENOUS at 08:24

## 2022-11-14 DIAGNOSIS — I77.89 ENLARGED THORACIC AORTA (H): ICD-10-CM

## 2022-11-14 DIAGNOSIS — Q23.81 BICUSPID AORTIC VALVE: Primary | ICD-10-CM

## 2022-12-27 ENCOUNTER — TRANSFERRED RECORDS (OUTPATIENT)
Dept: HEALTH INFORMATION MANAGEMENT | Facility: CLINIC | Age: 67
End: 2022-12-27

## 2023-01-30 ENCOUNTER — OFFICE VISIT (OUTPATIENT)
Dept: CARDIOLOGY | Facility: CLINIC | Age: 68
End: 2023-01-30
Attending: INTERNAL MEDICINE
Payer: COMMERCIAL

## 2023-01-30 VITALS
SYSTOLIC BLOOD PRESSURE: 116 MMHG | DIASTOLIC BLOOD PRESSURE: 66 MMHG | RESPIRATION RATE: 16 BRPM | HEIGHT: 75 IN | HEART RATE: 59 BPM | WEIGHT: 163 LBS | BODY MASS INDEX: 20.27 KG/M2

## 2023-01-30 DIAGNOSIS — I77.89 ENLARGED THORACIC AORTA (H): ICD-10-CM

## 2023-01-30 DIAGNOSIS — Q23.81 BICUSPID AORTIC VALVE: ICD-10-CM

## 2023-01-30 PROCEDURE — 99214 OFFICE O/P EST MOD 30 MIN: CPT | Performed by: INTERNAL MEDICINE

## 2023-01-30 RX ORDER — LEVOTHYROXINE SODIUM 112 UG/1
TABLET ORAL
COMMUNITY
Start: 2022-12-22

## 2023-01-30 RX ORDER — VITAMIN B COMPLEX
1 TABLET ORAL
COMMUNITY

## 2023-01-30 NOTE — LETTER
"1/30/2023    Jonnathan Ramírez MD  [facility] [address]  [facility] [address]    RE: Kyle Lou       Dear Colleague,     I had the pleasure of seeing Kyle Lou in the [facility].      [facility]  [phone]      Assessment/Recommendations   Patient with known bicuspid aortic valve, mild enlargement of the ascending aorta.  Recent cardiac MRI did not show any changes with an ascending aortic maximal diameter of 42 mm.  He is exercising on a regular basis, his blood pressure is at goal, and his LDL cholesterol is well controlled on no medications.    I have encouraged him to maintain an active lifestyle.  I have asked him to get a repeat cardiac MRI in April 2024 and see me thereafter.  He will call if he has any new symptoms.    Thank you for allowing us to participate in his care.       History of Present Illness/Subjective    Mr. Kyle Lou is a 67 year old male with known bicuspid aortic valve with no significant aortic stenosis and minimal aortic insufficiency.  He has mild enlargement of the ascending aorta at 42 mm and this is stable since 2015.  He is not had any new symptomatology.  He walks his dog on a regular basis and can go 2 miles without difficulty and goes 30+ minutes on a stationary bike on a regular basis without difficulty.  His breathing feels a little harder at the very beginning but quickly improves and is able to complete exercise without difficulty.    He denies orthopnea, paroxysmal nocturnal dyspnea, peripheral edema, syncope or near syncopal episodes and also denies palpitations.    Continues to work as an  with some increased stress.       Physical Examination Review of Systems   /66 (BP Location: Left arm, Patient Position: Sitting, Cuff Size: Adult Regular)   Pulse 59   Resp 16   Ht 1.905 m (6' 3\")   Wt 73.9 kg (163 lb)   BMI 20.37 kg/m    Body mass index is 20.37 kg/m .  Wt Readings from Last 3 Encounters: " "  23 73.9 kg (163 lb)   20 76.7 kg (169 lb)   19 77.3 kg (170 lb 6.4 oz)     General Appearance:   Alert, cooperative and in no acute distress.   ENT/Mouth: Patient wearing a mask.      EYES:  no scleral icterus, normal conjunctivae   Neck: JVP normal. No Hepatojugular reflux. Thyroid not visualized.   Chest/Lungs:   Lungs are clear to auscultation, equal chest wall expansion.   Cardiovascular:   S1, S2 with 1/6 diastolic murmur , no clicks or rubs. Brachial, radial  pulses are intact and symetric. No carotid bruits noted   Abdomen:  Nontender.    Extremities: No cyanosis, clubbing or edema   Skin: no xanthelasma, warm.    Neurologic: normal arm movement bilateral, no tremors     Psychiatric: Appropriate affect.      Enc Vitals  BP: 116/66  Pulse: 59  Resp: 16  Weight: 73.9 kg (163 lb)  Height: 190.5 cm (6' 3\")                                           Medical History  Surgical History Family History Social History   Past Medical History:   Diagnosis Date     High myopia, bilateral      Thyroid disease     Past Surgical History:   Procedure Laterality Date     CARDIAC SURGERY      bicuspid valve      GYN SURGERY      testicular vericosity removed      RETINAL REATTACHMENT Right      VASCULAR SURGERY      varicose vein removed LLE      ZZC RAD RESEC TONSIL/PILLARS      Family History   Family history unknown: Yes    Social History     Socioeconomic History     Marital status:      Spouse name: Not on file     Number of children: Not on file     Years of education: Not on file     Highest education level: Not on file   Occupational History     Not on file   Tobacco Use     Smoking status: Former     Packs/day: 0.25     Years: 4.00     Pack years: 1.00     Types: Cigarettes     Quit date: 1984     Years since quittin.1     Smokeless tobacco: Never   Substance and Sexual Activity     Alcohol use: No     Drug use: No     Sexual activity: Not on file   Other Topics Concern     Not on file "   Social History Narrative     Not on file     Social Determinants of Health     Financial Resource Strain: Not on file   Food Insecurity: Not on file   Transportation Needs: Not on file   Physical Activity: Not on file   Stress: Not on file   Social Connections: Not on file   Intimate Partner Violence: Not on file   Housing Stability: Not on file          Medications  Allergies   Current Outpatient Medications   Medication Sig Dispense Refill     Lactobacillus Rhamnosus, GG, (RA PROBIOTIC DIGESTIVE CARE) CAPS Take 1 capsule by mouth daily       levothyroxine (SYNTHROID/LEVOTHROID) 112 MCG tablet take 1 tablet by mouth every day in the morning on empty stomach       Levothyroxine Sodium (SYNTHROID PO) Take 125 mcg by mouth daily        LOSARTAN POTASSIUM PO Take 25 mg by mouth daily        Probiotic Product (PROBIOTIC DAILY PO)        Vitamin D3 (CHOLECALCIFEROL) 25 mcg (1000 units) tablet Take 1 tablet by mouth      Allergies   Allergen Reactions     No Known Allergies      Tamsulosin Hcl Unknown     Dry mouth         Lab Results    Chemistry/lipid CBC Cardiac Enzymes/BNP/TSH/INR   Lab Results   Component Value Date    BUN 22 04/30/2017     04/30/2017    CO2 26 04/30/2017    Lab Results   Component Value Date    WBC 7.6 04/30/2017    HGB 13.7 04/30/2017    HCT 40.9 04/30/2017    MCV 93 04/30/2017     (L) 04/30/2017    Lab Results   Component Value Date    TSH 0.07 (L) 04/30/2017                Thank you for allowing me to participate in the care of your patient.      Sincerely,     Javi Ray MD     [facility]  cc:   Javi Ray MD  [address] [facility] [place] [address]

## 2023-01-30 NOTE — PROGRESS NOTES
"[facility]  [phone]      Assessment/Recommendations   Patient with known bicuspid aortic valve, mild enlargement of the ascending aorta.  Recent cardiac MRI did not show any changes with an ascending aortic maximal diameter of 42 mm.  He is exercising on a regular basis, his blood pressure is at goal, and his LDL cholesterol is well controlled on no medications.    I have encouraged him to maintain an active lifestyle.  I have asked him to get a repeat cardiac MRI in April 2024 and see me thereafter.  He will call if he has any new symptoms.    Thank you for allowing us to participate in his care.       History of Present Illness/Subjective    Mr. Kyle Lou is a 67 year old male with known bicuspid aortic valve with no significant aortic stenosis and minimal aortic insufficiency.  He has mild enlargement of the ascending aorta at 42 mm and this is stable since 2015.  He is not had any new symptomatology.  He walks his dog on a regular basis and can go 2 miles without difficulty and goes 30+ minutes on a stationary bike on a regular basis without difficulty.  His breathing feels a little harder at the very beginning but quickly improves and is able to complete exercise without difficulty.    He denies orthopnea, paroxysmal nocturnal dyspnea, peripheral edema, syncope or near syncopal episodes and also denies palpitations.    Continues to work as an  with some increased stress.       Physical Examination Review of Systems   /66 (BP Location: Left arm, Patient Position: Sitting, Cuff Size: Adult Regular)   Pulse 59   Resp 16   Ht 1.905 m (6' 3\")   Wt 73.9 kg (163 lb)   BMI 20.37 kg/m    Body mass index is 20.37 kg/m .  Wt Readings from Last 3 Encounters:   01/30/23 73.9 kg (163 lb)   11/13/20 76.7 kg (169 lb)   04/11/19 77.3 kg (170 lb 6.4 oz)     General Appearance:   Alert, cooperative and in no acute distress.   ENT/Mouth: Patient wearing a mask.      EYES:  no " "scleral icterus, normal conjunctivae   Neck: JVP normal. No Hepatojugular reflux. Thyroid not visualized.   Chest/Lungs:   Lungs are clear to auscultation, equal chest wall expansion.   Cardiovascular:   S1, S2 with 1/6 diastolic murmur , no clicks or rubs. Brachial, radial  pulses are intact and symetric. No carotid bruits noted   Abdomen:  Nontender.    Extremities: No cyanosis, clubbing or edema   Skin: no xanthelasma, warm.    Neurologic: normal arm movement bilateral, no tremors     Psychiatric: Appropriate affect.      Enc Vitals  BP: 116/66  Pulse: 59  Resp: 16  Weight: 73.9 kg (163 lb)  Height: 190.5 cm (6' 3\")                                           Medical History  Surgical History Family History Social History   Past Medical History:   Diagnosis Date     High myopia, bilateral      Thyroid disease     Past Surgical History:   Procedure Laterality Date     CARDIAC SURGERY      bicuspid valve      GYN SURGERY      testicular vericosity removed      RETINAL REATTACHMENT Right      VASCULAR SURGERY      varicose vein removed LLE      ZZC RAD RESEC TONSIL/PILLARS      Family History   Family history unknown: Yes    Social History     Socioeconomic History     Marital status:      Spouse name: Not on file     Number of children: Not on file     Years of education: Not on file     Highest education level: Not on file   Occupational History     Not on file   Tobacco Use     Smoking status: Former     Packs/day: 0.25     Years: 4.00     Pack years: 1.00     Types: Cigarettes     Quit date: 1984     Years since quittin.1     Smokeless tobacco: Never   Substance and Sexual Activity     Alcohol use: No     Drug use: No     Sexual activity: Not on file   Other Topics Concern     Not on file   Social History Narrative     Not on file     Social Determinants of Health     Financial Resource Strain: Not on file   Food Insecurity: Not on file   Transportation Needs: Not on file   Physical Activity: Not " on file   Stress: Not on file   Social Connections: Not on file   Intimate Partner Violence: Not on file   Housing Stability: Not on file          Medications  Allergies   Current Outpatient Medications   Medication Sig Dispense Refill     Lactobacillus Rhamnosus, GG, (RA PROBIOTIC DIGESTIVE CARE) CAPS Take 1 capsule by mouth daily       levothyroxine (SYNTHROID/LEVOTHROID) 112 MCG tablet take 1 tablet by mouth every day in the morning on empty stomach       Levothyroxine Sodium (SYNTHROID PO) Take 125 mcg by mouth daily        LOSARTAN POTASSIUM PO Take 25 mg by mouth daily        Probiotic Product (PROBIOTIC DAILY PO)        Vitamin D3 (CHOLECALCIFEROL) 25 mcg (1000 units) tablet Take 1 tablet by mouth      Allergies   Allergen Reactions     No Known Allergies      Tamsulosin Hcl Unknown     Dry mouth         Lab Results    Chemistry/lipid CBC Cardiac Enzymes/BNP/TSH/INR   Lab Results   Component Value Date    BUN 22 04/30/2017     04/30/2017    CO2 26 04/30/2017    Lab Results   Component Value Date    WBC 7.6 04/30/2017    HGB 13.7 04/30/2017    HCT 40.9 04/30/2017    MCV 93 04/30/2017     (L) 04/30/2017    Lab Results   Component Value Date    TSH 0.07 (L) 04/30/2017

## 2023-04-21 ENCOUNTER — TELEPHONE (OUTPATIENT)
Dept: CARDIOLOGY | Facility: CLINIC | Age: 68
End: 2023-04-21
Payer: COMMERCIAL

## 2023-04-21 DIAGNOSIS — I77.89 ENLARGED THORACIC AORTA (H): ICD-10-CM

## 2023-04-21 DIAGNOSIS — Q23.81 BICUSPID AORTIC VALVE: Primary | ICD-10-CM

## 2023-04-21 NOTE — TELEPHONE ENCOUNTER
----- Message from Javi Ray MD sent at 1/30/2023 11:20 AM CST -----  Brooke,    Could we get a cardiac MRI with flow to evaluate aortic valve, and ascending aorta in April 2024.  It will not allow me to order that because it is our past 366 days.    Thanks,    Javi    ==  Order placed per THJ. -ejb

## 2023-05-02 ENCOUNTER — OFFICE VISIT (OUTPATIENT)
Dept: OPHTHALMOLOGY | Facility: CLINIC | Age: 68
End: 2023-05-02
Payer: COMMERCIAL

## 2023-05-02 DIAGNOSIS — H52.13 HIGH MYOPIA, BOTH EYES: ICD-10-CM

## 2023-05-02 DIAGNOSIS — H25.13 NUCLEAR SCLEROTIC CATARACT OF BOTH EYES: Primary | ICD-10-CM

## 2023-05-02 DIAGNOSIS — H35.373 EPIRETINAL MEMBRANE (ERM), BILATERAL: ICD-10-CM

## 2023-05-02 DIAGNOSIS — H35.379 EPIRETINAL MEMBRANE, UNSPECIFIED LATERALITY: ICD-10-CM

## 2023-05-02 PROCEDURE — 92014 COMPRE OPH EXAM EST PT 1/>: CPT | Performed by: OPHTHALMOLOGY

## 2023-05-02 PROCEDURE — 92015 DETERMINE REFRACTIVE STATE: CPT | Performed by: OPHTHALMOLOGY

## 2023-05-02 ASSESSMENT — VISUAL ACUITY
OS_PH_CC: 20/30
OS_CC: 20/40
OS_PH_CC+: -2
OD_CC: 20/25
CORRECTION_TYPE: GLASSES
METHOD: SNELLEN - LINEAR
OD_CC+: -1

## 2023-05-02 ASSESSMENT — EXTERNAL EXAM - RIGHT EYE: OD_EXAM: NORMAL

## 2023-05-02 ASSESSMENT — CONF VISUAL FIELD
OD_INFERIOR_TEMPORAL_RESTRICTION: 0
OS_NORMAL: 1
OS_INFERIOR_TEMPORAL_RESTRICTION: 0
OD_SUPERIOR_TEMPORAL_RESTRICTION: 0
OS_INFERIOR_NASAL_RESTRICTION: 0
OS_SUPERIOR_TEMPORAL_RESTRICTION: 0
METHOD: COUNTING FINGERS
OS_SUPERIOR_NASAL_RESTRICTION: 0
OD_SUPERIOR_NASAL_RESTRICTION: 0
OD_NORMAL: 1
OD_INFERIOR_NASAL_RESTRICTION: 0

## 2023-05-02 ASSESSMENT — TONOMETRY
OD_IOP_MMHG: 12
OS_IOP_MMHG: 11
IOP_METHOD: ICARE

## 2023-05-02 ASSESSMENT — REFRACTION_WEARINGRX
OD_SPHERE: -11.50
OS_CYLINDER: +1.00
OD_AXIS: 075
OS_ADD: +2.50
OS_AXIS: 155
OD_CYLINDER: +2.75
OS_SPHERE: -9.50
SPECS_TYPE: PAL
OD_ADD: +2.50

## 2023-05-02 ASSESSMENT — REFRACTION_MANIFEST
OS_CYLINDER: +1.50
OD_SPHERE: -11.75
OD_ADD: +2.50
OS_SPHERE: -10.50
OS_ADD: +2.50
OS_AXIS: 165
OD_CYLINDER: +2.50
OD_AXIS: 059

## 2023-05-02 ASSESSMENT — EXTERNAL EXAM - LEFT EYE: OS_EXAM: NORMAL

## 2023-05-02 ASSESSMENT — CUP TO DISC RATIO
OS_RATIO: 0.4
OD_RATIO: 0.65

## 2023-05-02 ASSESSMENT — SLIT LAMP EXAM - LIDS
COMMENTS: NORMAL
COMMENTS: NORMAL

## 2023-05-02 NOTE — NURSING NOTE
"Chief Complaints and History of Present Illnesses   Patient presents with     Annual Eye Exam     Chief Complaint(s) and History of Present Illness(es)     Annual Eye Exam            Laterality: both eyes    Associated symptoms: Negative for eye pain, redness, flashes and floaters    Pain scale: 0/10          Comments    Patient present for annual exam. Patient has had trouble finding rimless glasses like \"silhouettes\". Patient states blurring in distance and mid range.  OK Noel May 2, 2023 2:12 PM                "

## 2023-05-02 NOTE — PROGRESS NOTES
"HPI  Kyle Lou is a 67 year old male here for comprehensive eye exam.   HPI     Annual Eye Exam    In both eyes.  Associated symptoms include Negative for eye pain, redness, flashes and floaters.  Pain was noted as 0/10.           Comments    Patient present for annual exam. Patient has had trouble finding rimless glasses like \"silhouettes\". Patient states blurring in distance and mid range.  OK Noel May 2, 2023 2:12 PM           Last edited by Caridad Jones on 5/2/2023  2:12 PM.        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 Be late 90s?), mild cataracts both eyes, no trauma  Oc Meds:  none  FH:  Denies any glaucoma, age related macular degeneration, or other known eye diseases       Assessment & Plan      1. Nuclear sclerotic cataract of both eyes - Both Eyes    2. High myopia, both eyes - Both Eyes    3. Epiretinal membrane (ERM), bilateral - Both Eyes    4. Epiretinal membrane, unspecified laterality - Both Eyes      Cataracts progressing some from last visit and resulting myopic shift.  Changes in manifest refraction yield good visual acuity. Manifest refraction done and prescription for glasses given.    Discussed natural history of cataracts and that he may want to revisit monovision with contact lenses to decide if he would like that when time for future cataract surgery.  He thinks right eye was for near, but I cannot see any records of this.      Continue artificial tear drops four times a day as needed.     Epiretinal membrane, mild, not visually significant, not in fovea both eyes (nasal). Follow clinically      -----------------------------------------------------------------------------------    Patient disposition:   Return in about 1 year (around 5/2/2024) for Comprehensive Exam, OCT macula OU. Call for sooner appointment as needed.    Complete documentation of historical and " exam elements from today's encounter can be found in the full encounter summary report (not reduplicated in this progress note). I personally obtained the chief complaint(s) and history of present illness.  I have confirmed and edited as necessary the CC, HPI, PMH/PSH, social history, FMH, ROS, and exam/neuro findings as obtained by the technician or others. I have examined this patient myself and I personally viewed the image(s) and studies listed above and the documentation reflects my findings and interpretation.  I formulated and edited as necessary the assessment and plan and discussed the findings and management plan with the patient and family.     Mishel Ramey MD

## 2024-02-21 ENCOUNTER — TELEPHONE (OUTPATIENT)
Dept: OPHTHALMOLOGY | Facility: CLINIC | Age: 69
End: 2024-02-21
Payer: COMMERCIAL

## 2024-05-03 DIAGNOSIS — I77.89 ENLARGED THORACIC AORTA (H): Primary | ICD-10-CM

## 2024-05-07 ENCOUNTER — OFFICE VISIT (OUTPATIENT)
Dept: OPHTHALMOLOGY | Facility: CLINIC | Age: 69
End: 2024-05-07
Payer: COMMERCIAL

## 2024-05-07 DIAGNOSIS — H25.13 NUCLEAR SCLEROTIC CATARACT OF BOTH EYES: ICD-10-CM

## 2024-05-07 DIAGNOSIS — H04.123 DRY EYES, BILATERAL: ICD-10-CM

## 2024-05-07 DIAGNOSIS — H52.13 HIGH MYOPIA, BOTH EYES: Primary | ICD-10-CM

## 2024-05-07 DIAGNOSIS — H02.88B MEIBOMIAN GLAND DYSFUNCTION (MGD) OF UPPER AND LOWER LIDS OF BOTH EYES: ICD-10-CM

## 2024-05-07 DIAGNOSIS — H35.373 EPIRETINAL MEMBRANE (ERM), BILATERAL: ICD-10-CM

## 2024-05-07 DIAGNOSIS — H02.88A MEIBOMIAN GLAND DYSFUNCTION (MGD) OF UPPER AND LOWER LIDS OF BOTH EYES: ICD-10-CM

## 2024-05-07 PROCEDURE — 92134 CPTRZ OPH DX IMG PST SGM RTA: CPT | Performed by: OPHTHALMOLOGY

## 2024-05-07 PROCEDURE — 92014 COMPRE OPH EXAM EST PT 1/>: CPT | Performed by: OPHTHALMOLOGY

## 2024-05-07 ASSESSMENT — REFRACTION_WEARINGRX
OS_ADD: +2.50
OD_CYLINDER: +2.50
OD_SPHERE: -11.75
OS_AXIS: 165
OS_CYLINDER: +1.50
OD_ADD: +2.50
OS_SPHERE: -10.50
SPECS_TYPE: BIFOCAL
OD_AXIS: 059

## 2024-05-07 ASSESSMENT — VISUAL ACUITY
OD_CC+: -2
OD_CC: 20/40
OS_PH_CC: 20/25
OS_PH_CC+: -1
OD_PH_CC: 20/25
OS_CC: 20/40
METHOD: SNELLEN - LINEAR
CORRECTION_TYPE: GLASSES
OS_CC+: -1

## 2024-05-07 ASSESSMENT — REFRACTION_MANIFEST
OS_ADD: +2.50
OD_SPHERE: -12.00
OD_ADD: +2.50
OS_AXIS: 153
OD_CYLINDER: +2.50
OS_CYLINDER: +1.25
OS_SPHERE: -10.50
OD_AXIS: 065

## 2024-05-07 ASSESSMENT — CONF VISUAL FIELD
OD_SUPERIOR_NASAL_RESTRICTION: 0
OS_NORMAL: 1
OS_INFERIOR_TEMPORAL_RESTRICTION: 0
OS_SUPERIOR_TEMPORAL_RESTRICTION: 0
OD_SUPERIOR_TEMPORAL_RESTRICTION: 0
OS_INFERIOR_NASAL_RESTRICTION: 0
OS_SUPERIOR_NASAL_RESTRICTION: 0
METHOD: COUNTING FINGERS
OD_INFERIOR_NASAL_RESTRICTION: 0
OD_NORMAL: 1
OD_INFERIOR_TEMPORAL_RESTRICTION: 0

## 2024-05-07 ASSESSMENT — CUP TO DISC RATIO
OS_RATIO: 0.4
OD_RATIO: 0.65

## 2024-05-07 ASSESSMENT — TONOMETRY
OS_IOP_MMHG: 12
OD_IOP_MMHG: 13
IOP_METHOD: TONOPEN

## 2024-05-07 ASSESSMENT — SLIT LAMP EXAM - LIDS
COMMENTS: MGD 1+
COMMENTS: MGD 1+

## 2024-05-07 ASSESSMENT — EXTERNAL EXAM - RIGHT EYE: OD_EXAM: NORMAL

## 2024-05-07 ASSESSMENT — EXTERNAL EXAM - LEFT EYE: OS_EXAM: NORMAL

## 2024-05-07 NOTE — PROGRESS NOTES
HPI  Kyle Lou is a 68 year old male here for follow-up cataracts and epiretinal membrane both eyes.  Denies metamorphopsia but blurry since getting new glasses.  Not a lot of glare.  Irritation minimal both eyes.  HPI       Epiretinal Membrane Follow Up    This started 1 year ago.  Severity is moderate.  Occurring constantly.  Context:  distance vision.  Associated symptoms include Negative for glare and haloes.  Response to treatment was no improvement.             Comments    Since got new glasses distance vision has been blurry in both eyes.  Denies glare or halos.  Denies distortion.     Lilly Moser on 5/7/2024 at 8:12 AM             Last edited by Lilly Moser on 5/7/2024  8:12 AM.        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 Be late 90s?), mild cataracts both eyes, no trauma  Oc Meds:  none  FH:  Denies any glaucoma, age related macular degeneration, or other known eye diseases       Assessment & Plan      1. High myopia, both eyes - Both Eyes    2. Nuclear sclerotic cataract of both eyes - Both Eyes    3. Dry eyes, bilateral - Both Eyes    4. Meibomian gland dysfunction (MGD) of upper and lower lids of both eyes - Both Eyes      Cataracts causing myopic shift > 1D in last few years.  Changes in manifest refraction yield minimal improvement in visual acuity.    Discussed natural history of cataracts and that he may want to revisit monovision with contact lenses to decide if he would like that when time for future cataract surgery (possibly within the year).  He thinks previously his right eye was for near, but I cannot see any records of this.  If he does like his vision better in the contact lenses than with glasses, he could continue wearing them as tolerated.    Dry eyes- Continue artificial tear drops four times a day as needed. Add warm compresses at bedtime for mild meibomian  gland dysfunction.     Epiretinal membrane, mild, not visually significant, not in fovea both eyes (nasal). See OCT report from today. Follow clinically.  -----------------------------------------------------------------------------------    Patient disposition:   Return in about 2 weeks (around 5/21/2024) for ctl eval -former wearer may be interested in part time wear; monovision trial . Call for sooner appointment as needed.    Complete documentation of historical and exam elements from today's encounter can be found in the full encounter summary report (not reduplicated in this progress note). I personally obtained the chief complaint(s) and history of present illness.  I have confirmed and edited as necessary the CC, HPI, PMH/PSH, social history, FMH, ROS, and exam/neuro findings as obtained by the technician or others. I have examined this patient myself and I personally viewed the image(s) and studies listed above and the documentation reflects my findings and interpretation.  I formulated and edited as necessary the assessment and plan and discussed the findings and management plan with the patient and family.     Mishel Ramey MD

## 2024-05-07 NOTE — NURSING NOTE
Chief Complaints and History of Present Illnesses   Patient presents with    Epiretinal Membrane Follow Up     Chief Complaint(s) and History of Present Illness(es)       Epiretinal Membrane Follow Up              Onset: 1 year ago    Severity: moderate    Frequency: constantly    Context: distance vision    Associated symptoms: Negative for glare and haloes    Response to treatment: no improvement              Comments    Since got new glasses distance vision has been blurry in both eyes.  Denies glare or halos.  Denies distortion.     Lilly Moser on 5/7/2024 at 8:12 AM

## 2024-05-08 ENCOUNTER — OFFICE VISIT (OUTPATIENT)
Dept: CARDIOLOGY | Facility: CLINIC | Age: 69
End: 2024-05-08
Payer: COMMERCIAL

## 2024-05-08 VITALS
DIASTOLIC BLOOD PRESSURE: 74 MMHG | OXYGEN SATURATION: 99 % | HEART RATE: 54 BPM | BODY MASS INDEX: 20 KG/M2 | WEIGHT: 160 LBS | SYSTOLIC BLOOD PRESSURE: 122 MMHG

## 2024-05-08 DIAGNOSIS — Q23.81 BICUSPID AORTIC VALVE: ICD-10-CM

## 2024-05-08 DIAGNOSIS — I77.89 ENLARGED THORACIC AORTA (H): ICD-10-CM

## 2024-05-08 PROCEDURE — 99214 OFFICE O/P EST MOD 30 MIN: CPT | Performed by: INTERNAL MEDICINE

## 2024-05-08 NOTE — LETTER
5/8/2024    Jonnathan Ramírez MD  [facility] [address]  [facility] [address]    RE: Kyle Lou       Dear Colleague,     I had the pleasure of seeing Kyle Lou in the [facility].      [facility]  [phone]          Assessment/Recommendations   Patient with known bicuspid aortic valve, mild enlargement of his ascending aorta who is doing well from a functional capacity standpoint.  He is a bit overdue for his imaging of his thoracic aorta and aortic valve and this is scheduled in June.  He does have a feeling of shortness of breath when he starts exercise but he is able to work through it and it goes away and then he is able to do the same amount of effort without any difficulty.  No chest discomfort.  No pulmonary vascular congestion today with normal jugular venous pressure.    Would like to start with the cardiac MRI and evaluate both his ascending aorta, cardiac systolic function and valvular function.  If this is unremarkable, I would have a low threshold for stress test to ensure that he does not have any evidence of myocardial ischemia causing some of his feeling of dyspnea early in exercise.    We will obtain the blood work from his primary care physician as well.  Most recent blood work is not available in epic.           History of Present Illness/Subjective    Mr. Kyle Lou is a 68 year old male with known bicuspid aortic valve with mild enlargement of his ascending aorta.  He has had a good year.  He does feel a sense of shortness of breath when he starts his exercise routine but after 5 minutes or so he gets back to normal and is able to carry out the same amount of physical activity and exercises he has in the past without any changes.  He does not get any chest pain or chest tightness with this but just slight feeling of shortness of breath at the beginning.  He denies orthopnea, paroxysmal nocturnal dyspnea, peripheral edema, gets occasional  palpitations which are fleeting in nature.  No tachycardia has been reported.  No syncopal or near syncopal episodes.         Physical Examination Review of Systems   /74 (BP Location: Left arm, Patient Position: Sitting, Cuff Size: Adult Regular)   Pulse 54   Wt 72.6 kg (160 lb)   SpO2 99%   BMI 20.00 kg/m    Body mass index is 20 kg/m .  Wt Readings from Last 3 Encounters:   05/08/24 72.6 kg (160 lb)   01/30/23 73.9 kg (163 lb)   11/13/20 76.7 kg (169 lb)     General Appearance:   Alert, cooperative and in no acute distress.   ENT/Mouth: Pink/moist oral mucosa   EYES:  no scleral icterus, normal conjunctivae   Neck: JVP normal. No Hepatojugular reflux. Thyroid not visualized.   Chest/Lungs:   Lungs are clear to auscultation, equal chest wall expansion.   Cardiovascular:   S1, S2 with 2/6 systolic murmur , no clicks or rubs. Brachial, radial pulses are intact and symetric. No carotid bruits noted   Abdomen:     Extremities: No cyanosis, clubbing or edema   Skin: no xanthelasma, warm.    Neurologic: normal arm movement bilateral, no tremors     Psychiatric: Appropriate affect.      Enc Vitals  BP: 122/74  Pulse: 54  SpO2: 99 %  Weight: 72.6 kg (160 lb)                                           Medical History  Surgical History Family History Social History   Past Medical History:   Diagnosis Date    High myopia, bilateral     Thyroid disease     Past Surgical History:   Procedure Laterality Date    CARDIAC SURGERY      bicuspid valve     GYN SURGERY      testicular vericosity removed     RETINAL REATTACHMENT Right     VASCULAR SURGERY      varicose vein removed LLE     ZZC RAD RESEC TONSIL/PILLARS      Family History   Family history unknown: Yes    Social History     Socioeconomic History    Marital status:      Spouse name: Not on file    Number of children: Not on file    Years of education: Not on file    Highest education level: Not on file   Occupational History    Not on file   Tobacco Use     Smoking status: Former     Current packs/day: 0.00     Average packs/day: 0.3 packs/day for 4.0 years (1.0 ttl pk-yrs)     Types: Cigarettes     Start date: 1980     Quit date: 1984     Years since quittin.3    Smokeless tobacco: Never   Substance and Sexual Activity    Alcohol use: No    Drug use: No    Sexual activity: Not on file   Other Topics Concern    Not on file   Social History Narrative    Not on file     Social Determinants of Health     Financial Resource Strain: Not on file   Food Insecurity: Not on file   Transportation Needs: Not on file   Physical Activity: Not on file   Stress: Not on file   Social Connections: Not on file   Interpersonal Safety: Not on file   Housing Stability: Not on file          Medications  Allergies   Current Outpatient Medications   Medication Sig Dispense Refill    Lactobacillus Rhamnosus, GG, (RA PROBIOTIC DIGESTIVE CARE) CAPS Take 1 capsule by mouth daily      levothyroxine (SYNTHROID/LEVOTHROID) 112 MCG tablet take 1 tablet by mouth every day in the morning on empty stomach      Levothyroxine Sodium (SYNTHROID PO) Take 125 mcg by mouth daily       LOSARTAN POTASSIUM PO Take 25 mg by mouth daily       Probiotic Product (PROBIOTIC DAILY PO)       Vitamin D3 (CHOLECALCIFEROL) 25 mcg (1000 units) tablet Take 1 tablet by mouth      Allergies   Allergen Reactions    No Known Allergies     Tamsulosin Hcl Unknown     Dry mouth         Lab Results    Chemistry/lipid CBC Cardiac Enzymes/BNP/TSH/INR   Lab Results   Component Value Date    BUN 22 2017     2017    CO2 26 2017    Lab Results   Component Value Date    WBC 7.6 2017    HGB 13.7 2017    HCT 40.9 2017    MCV 93 2017     (L) 2017    Lab Results   Component Value Date    TSH 0.07 (L) 2017                Thank you for allowing me to participate in the care of your patient.      Sincerely,     Javi Ray MD     [facility]  [facility]  cc:   Javi Ray MD  [address] [facility] [place] [address]

## 2024-05-08 NOTE — PROGRESS NOTES
[facility]  [phone]          Assessment/Recommendations   Patient with known bicuspid aortic valve, mild enlargement of his ascending aorta who is doing well from a functional capacity standpoint.  He is a bit overdue for his imaging of his thoracic aorta and aortic valve and this is scheduled in June.  He does have a feeling of shortness of breath when he starts exercise but he is able to work through it and it goes away and then he is able to do the same amount of effort without any difficulty.  No chest discomfort.  No pulmonary vascular congestion today with normal jugular venous pressure.    Would like to start with the cardiac MRI and evaluate both his ascending aorta, cardiac systolic function and valvular function.  If this is unremarkable, I would have a low threshold for stress test to ensure that he does not have any evidence of myocardial ischemia causing some of his feeling of dyspnea early in exercise.    We will obtain the blood work from his primary care physician as well.  Most recent blood work is not available in epic.           History of Present Illness/Subjective    Mr. Kyle Lou is a 68 year old male with known bicuspid aortic valve with mild enlargement of his ascending aorta.  He has had a good year.  He does feel a sense of shortness of breath when he starts his exercise routine but after 5 minutes or so he gets back to normal and is able to carry out the same amount of physical activity and exercises he has in the past without any changes.  He does not get any chest pain or chest tightness with this but just slight feeling of shortness of breath at the beginning.  He denies orthopnea, paroxysmal nocturnal dyspnea, peripheral edema, gets occasional palpitations which are fleeting in nature.  No tachycardia has been reported.  No syncopal or near syncopal episodes.         Physical Examination Review of Systems   /74 (BP Location: Left arm, Patient  Position: Sitting, Cuff Size: Adult Regular)   Pulse 54   Wt 72.6 kg (160 lb)   SpO2 99%   BMI 20.00 kg/m    Body mass index is 20 kg/m .  Wt Readings from Last 3 Encounters:   05/08/24 72.6 kg (160 lb)   01/30/23 73.9 kg (163 lb)   11/13/20 76.7 kg (169 lb)     General Appearance:   Alert, cooperative and in no acute distress.   ENT/Mouth: Pink/moist oral mucosa   EYES:  no scleral icterus, normal conjunctivae   Neck: JVP normal. No Hepatojugular reflux. Thyroid not visualized.   Chest/Lungs:   Lungs are clear to auscultation, equal chest wall expansion.   Cardiovascular:   S1, S2 with 2/6 systolic murmur , no clicks or rubs. Brachial, radial pulses are intact and symetric. No carotid bruits noted   Abdomen:     Extremities: No cyanosis, clubbing or edema   Skin: no xanthelasma, warm.    Neurologic: normal arm movement bilateral, no tremors     Psychiatric: Appropriate affect.      Enc Vitals  BP: 122/74  Pulse: 54  SpO2: 99 %  Weight: 72.6 kg (160 lb)                                           Medical History  Surgical History Family History Social History   Past Medical History:   Diagnosis Date    High myopia, bilateral     Thyroid disease     Past Surgical History:   Procedure Laterality Date    CARDIAC SURGERY      bicuspid valve     GYN SURGERY      testicular vericosity removed     RETINAL REATTACHMENT Right     VASCULAR SURGERY      varicose vein removed LLE     ZZC RAD RESEC TONSIL/PILLARS      Family History   Family history unknown: Yes    Social History     Socioeconomic History    Marital status:      Spouse name: Not on file    Number of children: Not on file    Years of education: Not on file    Highest education level: Not on file   Occupational History    Not on file   Tobacco Use    Smoking status: Former     Current packs/day: 0.00     Average packs/day: 0.3 packs/day for 4.0 years (1.0 ttl pk-yrs)     Types: Cigarettes     Start date: 1/1/1980     Quit date: 1/1/1984     Years since  quittin.3    Smokeless tobacco: Never   Substance and Sexual Activity    Alcohol use: No    Drug use: No    Sexual activity: Not on file   Other Topics Concern    Not on file   Social History Narrative    Not on file     Social Determinants of Health     Financial Resource Strain: Not on file   Food Insecurity: Not on file   Transportation Needs: Not on file   Physical Activity: Not on file   Stress: Not on file   Social Connections: Not on file   Interpersonal Safety: Not on file   Housing Stability: Not on file          Medications  Allergies   Current Outpatient Medications   Medication Sig Dispense Refill    Lactobacillus Rhamnosus, GG, (RA PROBIOTIC DIGESTIVE CARE) CAPS Take 1 capsule by mouth daily      levothyroxine (SYNTHROID/LEVOTHROID) 112 MCG tablet take 1 tablet by mouth every day in the morning on empty stomach      Levothyroxine Sodium (SYNTHROID PO) Take 125 mcg by mouth daily       LOSARTAN POTASSIUM PO Take 25 mg by mouth daily       Probiotic Product (PROBIOTIC DAILY PO)       Vitamin D3 (CHOLECALCIFEROL) 25 mcg (1000 units) tablet Take 1 tablet by mouth      Allergies   Allergen Reactions    No Known Allergies     Tamsulosin Hcl Unknown     Dry mouth         Lab Results    Chemistry/lipid CBC Cardiac Enzymes/BNP/TSH/INR   Lab Results   Component Value Date    BUN 22 2017     2017    CO2 26 2017    Lab Results   Component Value Date    WBC 7.6 2017    HGB 13.7 2017    HCT 40.9 2017    MCV 93 2017     (L) 2017    Lab Results   Component Value Date    TSH 0.07 (L) 2017

## 2024-05-28 ENCOUNTER — TELEPHONE (OUTPATIENT)
Dept: OPHTHALMOLOGY | Facility: CLINIC | Age: 69
End: 2024-05-28
Payer: COMMERCIAL

## 2024-05-28 NOTE — TELEPHONE ENCOUNTER
[facility] Call Center    Phone Message    May a detailed message be left on voicemail: yes     Reason for Call: Other: Laureano, with [facility], called requesting patient's eye glass script. Please fax to: [phone].     Action Taken: Other: eye    Travel Screening: Not Applicable

## 2024-05-29 NOTE — TELEPHONE ENCOUNTER
[facility] Call Center    Phone Message    May a detailed message be left on voicemail: yes     Reason for Call: Form or Letter   Type or form/letter needing completion: glasses RX  Provider: Dr Ramey  Date form needed: ASAP  Once completed: Fax form to: Nancy Martinez in [place], [place]  Fax# [phone]  PH# [phone]  Pt may be reached if there are any questions Thank you     Action Taken: Message routed to:  [facility]): eye    Travel Screening: Not Applicable

## 2024-05-29 NOTE — TELEPHONE ENCOUNTER
[facility] Call Center    Phone Message    May a detailed message be left on voicemail: yes     Reason for Call:  Laureano with [facility] PH# [phone]   Fax# [phone]  Caller advised the glasses RX faxed over was  and they are needing the RX from pts appt on 24 with Dr Ramey. Please fax current RX and any questions please contact pt or Laureano with [facility] Thank you    Action Taken: Message routed to:  [facility] eye    Travel Screening: Not Applicable

## 2024-07-09 ENCOUNTER — HOSPITAL ENCOUNTER (OUTPATIENT)
Dept: CARDIOLOGY | Facility: CLINIC | Age: 69
Discharge: HOME | End: 2024-07-09
Attending: INTERNAL MEDICINE | Admitting: INTERNAL MEDICINE
Payer: COMMERCIAL

## 2024-07-09 VITALS — SYSTOLIC BLOOD PRESSURE: 128 MMHG | HEART RATE: 57 BPM | DIASTOLIC BLOOD PRESSURE: 74 MMHG

## 2024-07-09 DIAGNOSIS — I77.89 ENLARGED THORACIC AORTA (H): ICD-10-CM

## 2024-07-09 PROCEDURE — 71555 MRI ANGIO CHEST W OR W/O DYE: CPT

## 2024-07-09 PROCEDURE — 255N000002 HC RX 255 OP 636: Performed by: INTERNAL MEDICINE

## 2024-07-09 PROCEDURE — 75565 CARD MRI VELOC FLOW MAPPING: CPT | Mod: 26 | Performed by: INTERNAL MEDICINE

## 2024-07-09 PROCEDURE — 75561 CARDIAC MRI FOR MORPH W/DYE: CPT | Mod: 26 | Performed by: INTERNAL MEDICINE

## 2024-07-09 PROCEDURE — A9585 GADOBUTROL INJECTION: HCPCS | Performed by: INTERNAL MEDICINE

## 2024-07-09 PROCEDURE — 75565 CARD MRI VELOC FLOW MAPPING: CPT

## 2024-07-09 PROCEDURE — 71555 MRI ANGIO CHEST W OR W/O DYE: CPT | Mod: 26 | Performed by: INTERNAL MEDICINE

## 2024-07-09 PROCEDURE — 75561 CARDIAC MRI FOR MORPH W/DYE: CPT

## 2024-07-09 RX ORDER — DIAZEPAM 5 MG
5 TABLET ORAL EVERY 30 MIN PRN
Status: DISCONTINUED | OUTPATIENT
Start: 2024-07-09 | End: 2024-07-10 | Stop reason: HOSPADM

## 2024-07-09 RX ORDER — GADOBUTROL 604.72 MG/ML
14 INJECTION INTRAVENOUS ONCE
Status: COMPLETED | OUTPATIENT
Start: 2024-07-09 | End: 2024-07-09

## 2024-07-09 RX ORDER — LORAZEPAM 0.5 MG/1
0.5 TABLET ORAL EVERY 10 MIN PRN
Status: DISCONTINUED | OUTPATIENT
Start: 2024-07-09 | End: 2024-07-10 | Stop reason: HOSPADM

## 2024-07-09 RX ORDER — NITROGLYCERIN 0.4 MG/1
0.4 TABLET SUBLINGUAL EVERY 5 MIN PRN
Status: ACTIVE | OUTPATIENT
Start: 2024-07-09 | End: 2024-07-09

## 2024-07-09 RX ORDER — LIDOCAINE 40 MG/G
CREAM TOPICAL
OUTPATIENT
Start: 2024-07-09

## 2024-07-09 RX ADMIN — GADOBUTROL 14 ML: 604.72 INJECTION INTRAVENOUS at 08:59

## 2024-07-11 DIAGNOSIS — I77.89 ENLARGED THORACIC AORTA (H): ICD-10-CM

## 2024-07-11 DIAGNOSIS — R06.09 DYSPNEA ON EXERTION: Primary | ICD-10-CM

## 2024-07-11 DIAGNOSIS — Q23.81 BICUSPID AORTIC VALVE: ICD-10-CM

## 2024-07-23 ENCOUNTER — HOSPITAL ENCOUNTER (OUTPATIENT)
Dept: CARDIOLOGY | Facility: CLINIC | Age: 69
Discharge: HOME | End: 2024-07-23
Attending: INTERNAL MEDICINE | Admitting: INTERNAL MEDICINE
Payer: COMMERCIAL

## 2024-07-23 DIAGNOSIS — R06.09 DYSPNEA ON EXERTION: ICD-10-CM

## 2024-07-23 DIAGNOSIS — I77.89 ENLARGED THORACIC AORTA (H): ICD-10-CM

## 2024-07-23 DIAGNOSIS — Q23.81 BICUSPID AORTIC VALVE: ICD-10-CM

## 2024-07-23 PROCEDURE — 93325 DOPPLER ECHO COLOR FLOW MAPG: CPT | Mod: 26 | Performed by: INTERNAL MEDICINE

## 2024-07-23 PROCEDURE — 93350 STRESS TTE ONLY: CPT | Mod: 26 | Performed by: INTERNAL MEDICINE

## 2024-07-23 PROCEDURE — 255N000002 HC RX 255 OP 636: Performed by: INTERNAL MEDICINE

## 2024-07-23 PROCEDURE — 93016 CV STRESS TEST SUPVJ ONLY: CPT | Performed by: INTERNAL MEDICINE

## 2024-07-23 PROCEDURE — 93321 DOPPLER ECHO F-UP/LMTD STD: CPT | Mod: 26 | Performed by: INTERNAL MEDICINE

## 2024-07-23 PROCEDURE — 93018 CV STRESS TEST I&R ONLY: CPT | Performed by: INTERNAL MEDICINE

## 2024-07-23 PROCEDURE — 999N000208 ECHO STRESS ECHOCARDIOGRAM

## 2024-07-23 RX ADMIN — HUMAN ALBUMIN MICROSPHERES AND PERFLUTREN 9 ML: 10; .22 INJECTION, SOLUTION INTRAVENOUS at 11:51

## 2025-06-04 NOTE — PHARMACY-ADMISSION MEDICATION HISTORY
Admission medication history interview status for the 4/30/2017  admission is complete. See EPIC admission navigator for prior to admission medications     Medication history source reliability:Good    Actions taken by pharmacist (provider contacted, etc): Verified PTA med list with patient. He has taken all of his doses for today.    Additional medication history information not noted on PTA med list :None    Medication reconciliation/reorder completed by provider prior to medication history? No    Time spent in this activity: 20 mins    Prior to Admission medications    Medication Sig Last Dose Taking? Auth Provider   cyanocolbalamin (VITAMIN  B-12) 100 MCG tablet Take by mouth daily 4/30/2017 at am Yes Reported, Patient   Levothyroxine Sodium (SYNTHROID PO) Take 125 mcg by mouth daily  4/30/2017 at am Yes Reported, Patient   LOSARTAN POTASSIUM PO Take 25 mg by mouth daily  4/30/2017 at am Yes Reported, Patient   ASPIRIN PO Take 81 mg by mouth daily 4/30/2017 at am Yes Reported, Patient       
Warm

## 2025-07-15 ENCOUNTER — OFFICE VISIT (OUTPATIENT)
Dept: OPHTHALMOLOGY | Facility: CLINIC | Age: 70
End: 2025-07-15
Payer: MEDICARE

## 2025-07-15 DIAGNOSIS — H35.373 EPIRETINAL MEMBRANE (ERM), BILATERAL: ICD-10-CM

## 2025-07-15 DIAGNOSIS — H52.13 HIGH MYOPIA, BOTH EYES: ICD-10-CM

## 2025-07-15 DIAGNOSIS — H25.13 NUCLEAR SCLEROTIC CATARACT OF BOTH EYES: Primary | ICD-10-CM

## 2025-07-15 DIAGNOSIS — H43.813 POSTERIOR VITREOUS DETACHMENT OF BOTH EYES: ICD-10-CM

## 2025-07-15 PROCEDURE — 92015 DETERMINE REFRACTIVE STATE: CPT | Mod: GY | Performed by: OPHTHALMOLOGY

## 2025-07-15 PROCEDURE — 92134 CPTRZ OPH DX IMG PST SGM RTA: CPT | Performed by: OPHTHALMOLOGY

## 2025-07-15 PROCEDURE — 92014 COMPRE OPH EXAM EST PT 1/>: CPT | Performed by: OPHTHALMOLOGY

## 2025-07-15 ASSESSMENT — REFRACTION_MANIFEST
OS_SPHERE: -11.25
OS_AXIS: 160
OD_CYLINDER: +2.50
OD_SPHERE: -12.00
OS_ADD: +2.75
OD_AXIS: 066
OD_ADD: +2.75
OS_CYLINDER: +1.50

## 2025-07-15 ASSESSMENT — VISUAL ACUITY
OD_CC: J2
OS_PH_CC+: -2
OD_PH_CC: 20/30
OS_PH_CC: 20/30
OD_CC+: -2
CORRECTION_TYPE: GLASSES
OS_CC: 20/40
OS_CC: J1-3
OD_CC: 20/30
METHOD: SNELLEN - LINEAR
OD_PH_CC+: +2

## 2025-07-15 ASSESSMENT — REFRACTION_WEARINGRX
SPECS_TYPE: BIFOCAL
OD_CYLINDER: +2.50
OD_ADD: +2.50
OS_AXIS: 153
OS_CYLINDER: +1.25
OS_ADD: +2.50
OS_SPHERE: -10.50
OD_SPHERE: -12.00
OD_AXIS: 065

## 2025-07-15 ASSESSMENT — CONF VISUAL FIELD
OS_NORMAL: 1
OS_SUPERIOR_NASAL_RESTRICTION: 0
OS_SUPERIOR_TEMPORAL_RESTRICTION: 0
OD_INFERIOR_TEMPORAL_RESTRICTION: 0
METHOD: COUNTING FINGERS
OD_INFERIOR_NASAL_RESTRICTION: 0
OD_NORMAL: 1
OS_INFERIOR_NASAL_RESTRICTION: 0
OD_SUPERIOR_NASAL_RESTRICTION: 0
OS_INFERIOR_TEMPORAL_RESTRICTION: 0
OD_SUPERIOR_TEMPORAL_RESTRICTION: 0

## 2025-07-15 ASSESSMENT — CUP TO DISC RATIO
OD_RATIO: 0.65
OS_RATIO: 0.4

## 2025-07-15 ASSESSMENT — EXTERNAL EXAM - LEFT EYE: OS_EXAM: NORMAL

## 2025-07-15 ASSESSMENT — TONOMETRY
IOP_METHOD: TONOPEN
OD_IOP_MMHG: 9
OS_IOP_MMHG: 9

## 2025-07-15 ASSESSMENT — SLIT LAMP EXAM - LIDS
COMMENTS: MGD 1+
COMMENTS: MGD 1+

## 2025-07-15 ASSESSMENT — EXTERNAL EXAM - RIGHT EYE: OD_EXAM: NORMAL

## 2025-07-15 NOTE — PROGRESS NOTES
HPI  Kyle Lou is a 69 year old male here for follow-up cataracts and epiretinal membrane both eyes.  Denies metamorphopsia but vision left eye more than right eye has become blurry since getting new glasses.  Not a lot of glare.  Irritation minimal both eyes.  Minimal vision fluctuation.  HPI       COMPREHENSIVE EYE EXAM    In both eyes.  Treatments tried include no treatments.  Pain was noted as 0/10. Additional comments: Comprehensive exam and follow up due to Epiretinal membrane OU             Comments    Vision seem a little blurrier over the past year both distance and near.   Night vision a bit harder with each eye.   Not typically bothered by dryness unless tired.     ESPERANZA Burnett COT 8:33 AM July 15, 2025                 Last edited by Maricruz Maya COT on 7/15/2025  8:33 AM.        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 Be late 90s?), cataracts both eyes, no trauma  Oc Meds:  none  FH:  Denies any glaucoma, age related macular degeneration, or other known eye diseases       Assessment & Plan      1. Nuclear sclerotic cataract of both eyes - Both Eyes    2. High myopia, both eyes - Both Eyes    3. Epiretinal membrane (ERM), bilateral    4. Posterior vitreous detachment of both eyes      (H25.13) Nuclear sclerotic cataract of both eyes - Both Eyes  Comment:  Cataracts causing myopic shift > 1D in last few years.  Changes in manifest refraction yield mild improvement in visual acuity.    Discussed natural history of cataracts and that he is leaning towards plano aim both eyes versus monovision.  Does have option to revisit monovision with contact lenses to decide if he would like.   He thinks previously his right eye was for near, but I cannot see any records of this.    Update glasses and call sooner with vision changes, reassess cataracts in 9 months    High myopia both eyes    Comment: mild changes with manifest refraction  Vision mildly limited by cataract left eye more than right eye  Plan: manifest refraction done and prescription for glasses given to update  Reviewed signs and symptoms of flashes and floaters and to call immediately if this occurs    Epiretinal membrane, mild, not visually significant, not in fovea both eyes (nasal). See OCT report from today, which was stable. Follow clinically.    Posterior vitreous detachment both eyes  Comment: Stable  No vitreous hemorrhage/pigment, retinal tears, or detachment seen on exam today.  Plan:  Natural history of posterior vitreous detachment as well as retinal tear/detachment symptoms discussed with patient.  Told to call for prompt dilated exam if these symptoms occur.       -----------------------------------------------------------------------------------    Patient disposition:   Return in about 6 months (around 1/15/2026) for follow up- cataract. Call for sooner appointment as needed.    Complete documentation of historical and exam elements from today's encounter can be found in the full encounter summary report (not reduplicated in this progress note). I personally obtained the chief complaint(s) and history of present illness.  I have confirmed and edited as necessary the CC, HPI, PMH/PSH, social history, FMH, ROS, and exam/neuro findings as obtained by the technician or others. I have examined this patient myself and I personally viewed the image(s) and studies listed above and the documentation reflects my findings and interpretation.  I formulated and edited as necessary the assessment and plan and discussed the findings and management plan with the patient and family.     Mishel Ramey MD